# Patient Record
Sex: MALE | Race: WHITE
[De-identification: names, ages, dates, MRNs, and addresses within clinical notes are randomized per-mention and may not be internally consistent; named-entity substitution may affect disease eponyms.]

---

## 2017-03-02 ENCOUNTER — HOSPITAL ENCOUNTER (OUTPATIENT)
Dept: HOSPITAL 75 - 4TH | Age: 47
Setting detail: OBSERVATION
LOS: 1 days | Discharge: HOME | End: 2017-03-03
Attending: FAMILY MEDICINE | Admitting: FAMILY MEDICINE
Payer: COMMERCIAL

## 2017-03-02 VITALS — HEIGHT: 67 IN | WEIGHT: 152 LBS | BODY MASS INDEX: 23.86 KG/M2

## 2017-03-02 VITALS — SYSTOLIC BLOOD PRESSURE: 134 MMHG | DIASTOLIC BLOOD PRESSURE: 91 MMHG

## 2017-03-02 VITALS — SYSTOLIC BLOOD PRESSURE: 166 MMHG | DIASTOLIC BLOOD PRESSURE: 89 MMHG

## 2017-03-02 DIAGNOSIS — F17.210: ICD-10-CM

## 2017-03-02 DIAGNOSIS — F11.151: Primary | ICD-10-CM

## 2017-03-02 LAB
ALBUMIN SERPL-MCNC: 4.2 G/DL (ref 3.2–4.5)
ALT SERPL-CCNC: 21 U/L (ref 0–55)
ANION GAP SERPL CALC-SCNC: 11 MMOL/L (ref 5–14)
APAP SERPL-MCNC: < 10 UG/ML (ref 10–30)
AST SERPL-CCNC: 19 U/L (ref 5–34)
BASOPHILS # BLD AUTO: 0 10^3/UL (ref 0–0.1)
BASOPHILS NFR BLD AUTO: 0 % (ref 0–10)
BILIRUB SERPL-MCNC: 1.2 MG/DL (ref 0.1–1)
BUN SERPL-MCNC: 12 MG/DL (ref 7–18)
BUN/CREAT SERPL: 12
CALCIUM SERPL-MCNC: 9.3 MG/DL (ref 8.5–10.1)
CHLORIDE SERPL-SCNC: 105 MMOL/L (ref 98–107)
CO2 SERPL-SCNC: 20 MMOL/L (ref 21–32)
CREAT SERPL-MCNC: 0.99 MG/DL (ref 0.6–1.3)
EOSINOPHIL # BLD AUTO: 0.2 10^3/UL (ref 0–0.3)
EOSINOPHIL NFR BLD AUTO: 2 % (ref 0–10)
ERYTHROCYTE [DISTWIDTH] IN BLOOD BY AUTOMATED COUNT: 12.7 % (ref 10–14.5)
ETHANOL SERPL-MCNC: < 10 MG/DL (ref ?–10)
GFR SERPLBLD BASED ON 1.73 SQ M-ARVRAT: > 60 ML/MIN
GLUCOSE SERPL-MCNC: 89 MG/DL (ref 70–105)
INR PPP: 1 (ref 0.8–1.4)
LYMPHOCYTES # BLD AUTO: 1.8 X 10^3 (ref 1–4)
LYMPHOCYTES NFR BLD AUTO: 19 % (ref 12–44)
MCH RBC QN AUTO: 31 PG (ref 25–34)
MCHC RBC AUTO-ENTMCNC: 35 G/DL (ref 32–36)
MCV RBC AUTO: 87 FL (ref 80–99)
MONOCYTES # BLD AUTO: 0.9 X 10^3 (ref 0–1)
MONOCYTES NFR BLD AUTO: 9 % (ref 0–12)
NEUTROPHILS # BLD AUTO: 6.5 X 10^3 (ref 1.8–7.8)
NEUTROPHILS NFR BLD AUTO: 69 % (ref 42–75)
PLATELET # BLD: 276 10^3/UL (ref 130–400)
PMV BLD AUTO: 10.2 FL (ref 7.4–10.4)
POTASSIUM SERPL-SCNC: 3.6 MMOL/L (ref 3.6–5)
PROT SERPL-MCNC: 7.2 G/DL (ref 6.4–8.2)
PROTHROMBIN TIME: 13.3 SEC (ref 12.2–14.7)
RBC # BLD AUTO: 4.9 10^6/UL (ref 4.35–5.85)
SALICYLATES SERPL-MCNC: < 5 MG/DL (ref 5–20)
SODIUM SERPL-SCNC: 136 MMOL/L (ref 135–145)
WBC # BLD AUTO: 9.4 10^3/UL (ref 4.3–11)

## 2017-03-02 PROCEDURE — 99211 OFF/OP EST MAY X REQ PHY/QHP: CPT

## 2017-03-02 PROCEDURE — 85610 PROTHROMBIN TIME: CPT

## 2017-03-02 PROCEDURE — 80320 DRUG SCREEN QUANTALCOHOLS: CPT

## 2017-03-02 PROCEDURE — 36415 COLL VENOUS BLD VENIPUNCTURE: CPT

## 2017-03-02 PROCEDURE — 80306 DRUG TEST PRSMV INSTRMNT: CPT

## 2017-03-02 PROCEDURE — 80329 ANALGESICS NON-OPIOID 1 OR 2: CPT

## 2017-03-02 PROCEDURE — 81000 URINALYSIS NONAUTO W/SCOPE: CPT

## 2017-03-02 PROCEDURE — 80053 COMPREHEN METABOLIC PANEL: CPT

## 2017-03-02 PROCEDURE — 85025 COMPLETE CBC W/AUTO DIFF WBC: CPT

## 2017-03-02 RX ADMIN — SODIUM CHLORIDE SCH MLS/HR: 900 INJECTION, SOLUTION INTRAVENOUS at 17:22

## 2017-03-02 RX ADMIN — NICOTINE SCH MG: 21 PATCH, EXTENDED RELEASE TRANSDERMAL at 18:29

## 2017-03-03 VITALS — DIASTOLIC BLOOD PRESSURE: 87 MMHG | SYSTOLIC BLOOD PRESSURE: 135 MMHG

## 2017-03-03 VITALS — SYSTOLIC BLOOD PRESSURE: 138 MMHG | DIASTOLIC BLOOD PRESSURE: 86 MMHG

## 2017-03-03 VITALS — DIASTOLIC BLOOD PRESSURE: 78 MMHG | SYSTOLIC BLOOD PRESSURE: 127 MMHG

## 2017-03-03 LAB
BILIRUB UR QL STRIP: NEGATIVE
KETONES UR QL STRIP: NEGATIVE
LEUKOCYTE ESTERASE UR QL STRIP: NEGATIVE
NITRITE UR QL STRIP: NEGATIVE
PH UR STRIP: 6 [PH] (ref 5–9)
PROT UR QL STRIP: NEGATIVE
SP GR UR STRIP: 1.02 (ref 1.02–1.02)
UROBILINOGEN UR-MCNC: 4 MG/DL
WBC #/AREA URNS HPF: (no result) /HPF

## 2017-03-03 RX ADMIN — SODIUM CHLORIDE SCH MLS/HR: 900 INJECTION, SOLUTION INTRAVENOUS at 00:30

## 2017-03-03 RX ADMIN — NICOTINE SCH MG: 21 PATCH, EXTENDED RELEASE TRANSDERMAL at 09:02

## 2017-03-03 NOTE — DISCHARGE INSTRUCTIONS
Discharge [facility]


Discharge Medications


New, Converted or Re-Newed RX:  Other (No new scripts)


Continued Medications:  


Aspirin (Aspirin 81 Mg Chew Tab) 81 Mg Chew


81 MG PO DAILY








Patient Instructions


Goal/Follow Up Appt:  


You have an appointment today with outpatient treatment at 1pm


We will make sure that you have a ride to get to this appointment from the


hospital


Patient Instructions:  


- It is very important that you go to outpatient treatment for the support


you need to help with your addiction.


- We are working on inpatient treatment but we are limited do to bed


availablities and insurance


Return to The Hospital For:  


Chest pain


Shortness of breath





Activity & Diet


Discharge Diet:  No Restrictions


Activity as Tolerated:  Yes





Orders-Post D/C & Referrals


Pneu Vac Indicated:  Yes





Copy


Copies To 1:   MARILU AGUILAR MD, HOLLY R MD Mar 3, 2017 10:53
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
## 2017-06-22 ENCOUNTER — HOSPITAL ENCOUNTER (EMERGENCY)
Dept: HOSPITAL 75 - ER | Age: 47
Discharge: HOME | End: 2017-06-22
Payer: SELF-PAY

## 2017-06-22 VITALS — HEIGHT: 69 IN | BODY MASS INDEX: 25.92 KG/M2 | WEIGHT: 175 LBS

## 2017-06-22 VITALS — SYSTOLIC BLOOD PRESSURE: 138 MMHG | DIASTOLIC BLOOD PRESSURE: 90 MMHG

## 2017-06-22 DIAGNOSIS — I10: ICD-10-CM

## 2017-06-22 DIAGNOSIS — F41.9: ICD-10-CM

## 2017-06-22 DIAGNOSIS — R07.9: Primary | ICD-10-CM

## 2017-06-22 DIAGNOSIS — F17.210: ICD-10-CM

## 2017-06-22 DIAGNOSIS — M50.90: ICD-10-CM

## 2017-06-22 DIAGNOSIS — I25.10: ICD-10-CM

## 2017-06-22 DIAGNOSIS — Z95.5: ICD-10-CM

## 2017-06-22 DIAGNOSIS — E78.00: ICD-10-CM

## 2017-06-22 DIAGNOSIS — F32.9: ICD-10-CM

## 2017-06-22 DIAGNOSIS — Z79.82: ICD-10-CM

## 2017-06-22 DIAGNOSIS — G62.9: ICD-10-CM

## 2017-06-22 DIAGNOSIS — I25.2: ICD-10-CM

## 2017-06-22 LAB
ALBUMIN SERPL-MCNC: 3.9 GM/DL (ref 3.2–4.5)
ALT SERPL-CCNC: 10 U/L (ref 0–55)
ANION GAP SERPL CALC-SCNC: 7 MMOL/L (ref 5–14)
AST SERPL-CCNC: 15 U/L (ref 5–34)
BASOPHILS # BLD AUTO: 0.1 10^3/UL (ref 0–0.1)
BASOPHILS NFR BLD AUTO: 1 % (ref 0–10)
BILIRUB SERPL-MCNC: 0.4 MG/DL (ref 0.1–1)
BUN SERPL-MCNC: 7 MG/DL (ref 7–18)
BUN/CREAT SERPL: 7 (ref 0–20)
CALCIUM SERPL-MCNC: 9.2 MG/DL (ref 8.5–10.1)
CHLORIDE SERPL-SCNC: 107 MMOL/L (ref 98–107)
CO2 SERPL-SCNC: 25 MMOL/L (ref 21–32)
CREAT SERPL-MCNC: 0.95 MG/DL (ref 0.6–1.3)
EOSINOPHIL # BLD AUTO: 0.4 10^3/UL (ref 0–0.3)
EOSINOPHIL NFR BLD AUTO: 4 % (ref 0–10)
ERYTHROCYTE [DISTWIDTH] IN BLOOD BY AUTOMATED COUNT: 13.8 % (ref 10–14.5)
ETHANOL SERPL-MCNC: < 10 MG/DL (ref ?–10)
GFR SERPLBLD BASED ON 1.73 SQ M-ARVRAT: > 60 ML/MIN
GLUCOSE SERPL-MCNC: 119 MG/DL (ref 70–105)
HEMOLYSIS: 10 (ref -100–29)
ICTERUS: 0.4 (ref -100–1.9)
LIPEMIA: 5 (ref -100–49)
LYMPHOCYTES # BLD AUTO: 2.2 X 10^3 (ref 1–4)
LYMPHOCYTES NFR BLD AUTO: 24 % (ref 12–44)
MCH RBC QN AUTO: 31 PG (ref 25–34)
MCHC RBC AUTO-ENTMCNC: 34 G/DL (ref 32–36)
MCV RBC AUTO: 91 FL (ref 80–99)
MONOCYTES # BLD AUTO: 0.9 X 10^3 (ref 0–1)
MONOCYTES NFR BLD AUTO: 10 % (ref 0–12)
NEUTROPHILS # BLD AUTO: 5.6 X 10^3 (ref 1.8–7.8)
NEUTROPHILS NFR BLD AUTO: 61 % (ref 42–75)
PLATELET # BLD: 235 10^3/UL (ref 130–400)
PMV BLD AUTO: 10.5 FL (ref 7.4–10.4)
POTASSIUM SERPL-SCNC: 3.7 MMOL/L (ref 3.6–5)
PROT SERPL-MCNC: 7.2 GM/DL (ref 6.4–8.2)
RBC # BLD AUTO: 5.04 10^6/UL (ref 4.35–5.85)
SODIUM SERPL-SCNC: 139 MMOL/L (ref 135–145)
TROPONIN I SERPL-MCNC: < 0.3 NG/ML (ref ?–0.3)
WBC # BLD AUTO: 9.1 10^3/UL (ref 4.3–11)

## 2017-06-22 PROCEDURE — 80053 COMPREHEN METABOLIC PANEL: CPT

## 2017-06-22 PROCEDURE — 85025 COMPLETE CBC W/AUTO DIFF WBC: CPT

## 2017-06-22 PROCEDURE — 36415 COLL VENOUS BLD VENIPUNCTURE: CPT

## 2017-06-22 PROCEDURE — 71010: CPT

## 2017-06-22 PROCEDURE — 93005 ELECTROCARDIOGRAM TRACING: CPT

## 2017-06-22 PROCEDURE — 84484 ASSAY OF TROPONIN QUANT: CPT

## 2017-06-22 PROCEDURE — 80320 DRUG SCREEN QUANTALCOHOLS: CPT

## 2017-06-22 NOTE — ED CHEST PAIN
General


Chief Complaint:  Cardiac/General Problems


Stated Complaint:  BLOOD PANEL NEEDED POSS HEART ATTACK IN LAST WEEK


Nursing Triage Note:  


c/o intermittant chest pain x 1 month. Denies chest pain currently. Hx of heart 


stent x 2013.


Nursing Sepsis Screen:  No Definite Risk


Source:  patient, family


Exam Limitations:  no limitations





History of Present Illness


Time seen by provider:  16:21


Initial Comments


The patient is a 47-year-old white male who was sent out from [facility] 

after he revealed that he had been having chest pain for a month.  He has a 

past history of coronary artery disease.  An angiogram report from 2013 showed 

a 60 percent blockage.  He is a heavy smoker and was advised that he must quit 

smoking at that point in time.  He continues to smoke one to one and a half 

packs of cigarettes per day.  He reports that this pain is present at rest and 

if anything it improves with exercise.  He also has complaints of neck pain and 

left arm pain.  This is been present for some time and the pain goes all the 

way to the fingers.  There is also pain in a rather constant fashion along the 

medial border of the left scapula.


Timing/Duration:  other (at least one month)


Severity/Quality:  moderate


Location:  shoulder, back


Radiation:  arms (perhaps left arm)


Prior CP/Workup:  cardiac cath


Associated Symptoms:  back pain





Allergies and Home Medications


Allergies


Coded Allergies:  


     No Known Drug Allergies (Unverified , 1/4/13)





Home Medications


Aspirin 81 Mg Chew, 81 MG PO DAILY, (Reported)





Review of Systems


Constitutional:  see HPI


EENTM:  No Symptoms Reported


Respiratory:  Cough, Shortness of Air


Cardiovascular:  See HPI, Chest Pain


Gastrointestinal:  No Symptoms Reported


Genitourinary:  No Symptoms Reported


Musculoskeletal:  no symptoms reported


Skin:  no symptoms reported


Psychiatric/Neurological:  No Symptoms Reported


Endocrine:  No Symptoms Reported


Hematologic/Lymphatic:  No Symptoms Reported





Past Medical-Social-Family Hx


Patient Social History


Alcohol Use:  Occasionally Uses


Recreational Drug Use:  Yes (Hx & Current drug use)


Smoking Status:  Current Everyday Smoker


Type Used:  Cigarettes


Recent Foreign Travel:  No


Contact w/Someone Who Travel:  No


Recent Infectious Disease Expo:  No


Recent Hopitalizations:  Yes





Immunizations Up To Date


Tetanus Booster (TDap):  Unknown


PED Vaccines UTD:  No





Surgeries


HX Surgeries:  Yes (JAW FX REPAIR)


Surgeries:  Cardiac, Coronary Stent





Respiratory


Hx Respiratory Disorders:  No





Cardiovascular


Hx Cardiac Disorders:  Yes (STENT X 1 )


Cardiac Disorders:  Coronary Artery Disease, Heart Attack, Heart Murmur, High 

Cholesterol, Hypertension





Neurological


Hx Neurological Disorders:  No





Reproductive System


Hx Reproductive Disorders:  No


Sexually Transmitted Disease:  No


HIV/AIDS:  No





Genitourinary


Hx Genitourinary Disorders:  Yes


Genitourinary Disorders:  Kidney Stones





Gastrointestinal


Hx Gastrointestinal Disorders:  Yes (HX OF BLOOD IN STOOL)





Musculoskeletal


Hx Musculoskeletal Disorders:  Yes


Musculoskeletal Disorders:  Arthritis, Chronic Back Pain





Endocrine


Hx Endocrine Disorders:  No





HEENT


HX ENT Disorders:  No


Loss of Vision:  Denies


Hearing Impairment:  Denies





Cancer


Hx Cancer:  No





Psychosocial


Hx Psychiatric Problems:  Yes


Behavioral Health Disorders:  Anxiety, Depression





Integumentary


HX Skin/Integumentary Disorder:  No





Blood Transfusions


Hx Blood Disorders:  No





Physical Exam


Vital Signs





Vital Sign - Last 12Hours








 6/22/17





 14:20


 


Temp 97.5


 


Pulse 70


 


Resp 16


 


B/P (MAP) 143/104


 


O2 Delivery Room Air





Capillary Refill : Less Than 3 Seconds


General Appearance:  Mild Distress


HEENT:  Normal ENT Inspection


Neck:  Full Range of Motion, Normal Inspection, Non Tender


Respiratory:  Decreased Breath Sounds (distant)


Cardiovascular:  Regular Rate, Rhythm, No Edema, No Gallop, No JVD, No Murmur, 

Normal Peripheral Pulses


Gastrointestinal:  Normal Bowel Sounds, No Organomegaly, No Pulsatile Mass, Non 

Tender


Extremity:  Normal Capillary Refill, Normal Inspection, Normal Range of Motion, 

Non Tender, No Calf Tenderness, No Pedal Edema


Neurologic/Psychiatric:  Alert, Oriented x3, No Motor/Sensory Deficits, Normal 

Mood/Affect


Other comments


Tenderness to palpation along the medial border of the left scapula which he 

states is constant.  He also reports numbness and tingling in his left arm all 

the way to the fingertips





Progress/Results/Core Measures


Results/Orders


Lab Results





Laboratory Tests








Test


  6/22/17


14:35 Range/Units


 


 


White Blood Count


  9.1 


  4.3-11.0


10^3/uL


 


Red Blood Count


  5.04 


  4.35-5.85


10^6/uL


 


Hemoglobin 15.6  13.3-17.7  G/DL


 


Hematocrit 46  40-54  %


 


Mean Corpuscular Volume 91  80-99  FL


 


Mean Corpuscular Hemoglobin 31  25-34  PG


 


Mean Corpuscular Hemoglobin


Concent 34 


  32-36  G/DL


 


 


Red Cell Distribution Width 13.8  10.0-14.5  %


 


Platelet Count


  235 


  130-400


10^3/uL


 


Mean Platelet Volume 10.5 H 7.4-10.4  FL


 


Neutrophils (%) (Auto) 61  42-75  %


 


Lymphocytes (%) (Auto) 24  12-44  %


 


Monocytes (%) (Auto) 10  0-12  %


 


Eosinophils (%) (Auto) 4  0-10  %


 


Basophils (%) (Auto) 1  0-10  %


 


Neutrophils # (Auto) 5.6  1.8-7.8  X 10^3


 


Lymphocytes # (Auto) 2.2  1.0-4.0  X 10^3


 


Monocytes # (Auto) 0.9  0.0-1.0  X 10^3


 


Eosinophils # (Auto)


  0.4 H


  0.0-0.3


10^3/uL


 


Basophils # (Auto)


  0.1 


  0.0-0.1


10^3/uL


 


Sodium Level 139  135-145  MMOL/L


 


Potassium Level 3.7  3.6-5.0  MMOL/L


 


Chloride Level 107    MMOL/L


 


Carbon Dioxide Level 25  21-32  MMOL/L


 


Anion Gap 7  5-14  MMOL/L


 


Blood Urea Nitrogen 7  7-18  MG/DL


 


Creatinine


  0.95 


  0.60-1.30


MG/DL


 


Estimat Glomerular Filtration


Rate > 60 


   


 


 


BUN/Creatinine Ratio 7  0-20  


 


Glucose Level 119 H   MG/DL


 


Calcium Level 9.2  8.5-10.1  MG/DL


 


Total Bilirubin 0.4  0.1-1.0  MG/DL


 


Aspartate Amino Transf


(AST/SGOT) 15 


  5-34  U/L


 


 


Alanine Aminotransferase


(ALT/SGPT) 10 


  0-55  U/L


 


 


Alkaline Phosphatase 129    U/L


 


Troponin I < 0.30  <0.30  NG/ML


 


Total Protein 7.2  6.4-8.2  GM/DL


 


Albumin 3.9  3.2-4.5  GM/DL


 


Serum Alcohol < 10  <10  MG/DL








My Orders





Orders - ALDEN BAIRES MD


Cbc With Automated Diff (6/22/17 14:24)


Comprehensive Metabolic Panel (6/22/17 14:24)


Troponin I (6/22/17 14:24)


Chest 1 View, Ap/Pa Only (6/22/17 14:24)


Ekg Tracing (6/22/17 14:24)


Alcohol (6/22/17 14:28)


Drug Screen Stat (Urine) (6/22/17 14:28)





Vital Signs/I&O





Vital Sign - Last 12Hours








 6/22/17





 14:20


 


Temp 97.5


 


Pulse 70


 


Resp 16


 


B/P (MAP) 143/104


 


O2 Delivery Room Air














Blood Pressure Mean:  117











Departure


Impression


Impression:  


 Primary Impression:  


 chest pain


 Additional Impression:  


 left sided neuropathy/cervical disc disease


Disposition:  01 HOME, SELF-CARE


Condition:  Stable/Unchanged





Departure-Patient Inst.


Decision time for Depature:  16:30


Referrals:  


MARILU AGUILAR MD (PCP)


Primary Care Physician








[facility] (Family)


Primary Care Physician


Patient Instructions:  Chest Pain (DC)





Add. Discharge Instructions:  


All discharge instructions reviewed with patient and/or family. Voiced 

understanding.


Stop smoking


Make arrangements with Dr. Aguilar for stress test and evaluation of your 

cervical vertebrae/discs











ALDEN BAIRES MD Jun 22, 2017 16:27

## 2017-06-22 NOTE — DIAGNOSTIC IMAGING REPORT
INDICATION: Chest pain.



EXAM: Frontal chest obtained at 2:49 hours p.m.



FINDINGS:  

The heart and mediastinal silhouette are normal in appearance.

The lungs are clear. There is no pneumothorax or pleural fluid.



IMPRESSION:



Negative chest.



Dictated by: 



  Dictated on workstation # [id number]

## 2017-06-26 NOTE — XMS REPORT
Continuity of Care Document

 Created on: 2017



ROSE LALA

External Reference #: [id number]

: 1970

Sex: Male



Demographics







 Address  [address]  [address]

 

 Home Phone  [phone]

 

 Preferred Language  Unknown

 

 Marital Status  Unknown

 

 Yarsani Affiliation  Unknown

 

 Race  Unknown

 

 Ethnic Group  Unknown





Author







 Author  [facility]

 

 Address  Unknown

 

 Phone  Unavailable



                                                      



Allergies

                      





 Active                    Description                    Code                  
  Type                    Severity                    Reaction                  
  Onset                    Reported/Identified                    Relationship 
to Patient                    Clinical Status                

 

 Yes                    No Known Drug Allergies                    [id number]  
                  Drug Allergy                    Unknown                    N/
A                                         2013                           
                               

 

 Yes                    Zoloft 100 mg tablet                                   
      Drug Allergy                    N/A                    N/A               
                          2014                                           
               

 

 Yes                    Wellbutrin 100 mg tablet                               
          Drug Allergy                    N/A                    N/A           
                              2014                                       
                   



                                                                               
                             



Medications

                                                                               
         



Problems

                      





 Date Dx Coded                    Attending                    Type            
        Code                    Diagnosis                    Diagnosed By      
          

 

 2013                                         Ot                    
305.00                    ALCOHOL ABUSE-UNSPEC                                 
    

 

 2013                                         Ot                    305.1
                    TOBACCO USE DISORDER                                     

 

 2013                                         Ot                    
305.90                    DRUG ABUSE NEC-UNSPEC                                
     

 

 2013                                         Ot                    
414.01                    CORONARY ATHEROSCLEROSIS OF NATIVE CORON             
                        

 

 2013                                         Ot                    
786.50                    CHEST PAIN NOS                                     

 

 2013                                         Ot                    
V17.49                    FAMILY HISTORY OF OTHER CARDIOVASCULAR D             
                        

 

 2013                    MYKE PHILLIPS DO                                
         300.00                    ANXIETY UNSPEC                              
       

 

 2013                    MYKE PHILLIPS DO                                
         780.99                    ANHEDONIA                                   
  

 

 2013                    BALAJI STARR                           
              300.00                    ANXIETY UNSPEC                         
            

 

 2013                    BALAJI STARR                           
              780.99                    ANHEDONIA                              
       

 

 2013                                                              300.00
                    ANXIETY UNSPEC                                     

 

 2013                                                              780.99
                    ANHEDONIA                                     

 

 2013                    MYKE PHILLIPS DO                                
         300.00                    ANXIETY UNSPEC                              
       

 

 2013                    MYKE PHILLIPS DO                                
         780.99                    ANHEDONIA                                   
  

 

 2013                    KIANA GALLEGOS                          
               300.00                    ANXIETY UNSPEC                        
             

 

 2013                    KIANA GALLEGOS                          
               780.99                    ANHEDONIA                             
        

 

 2013                    MARILU AGUILAR MD                             
            300.00                    ANXIETY UNSPEC                           
          

 

 2013                    MARILU AGUILAR MD                             
            780.99                    ANHEDONIA                                
     

 

 2013                    MARILU AGUILAR MD                             
            300.00                    ANXIETY UNSPEC                           
          

 

 2013                    MARILU AGUILAR MD                             
            780.99                    ANHEDONIA                                
     

 

 2013                    MARILU AGUILAR MD                             
            300.00                    ANXIETY UNSPEC                           
          

 

 2013                    MARILU AGUILAR MD                             
            780.99                    ANHEDONIA                                
     

 

 2013                    NATALIA BAUTISTA DDS                            
             300.00                    ANXIETY UNSPEC                          
           

 

 2013                    NATALIA BAUTISTA DDS                            
             780.99                    ANHEDONIA                               
      

 

 2013                    BALAJI STARR                           
              719.41                    PAIN IN JOINT INVOLVING SHOULDER REGION
                                     

 

 2013                                                              719.41
                    PAIN IN JOINT INVOLVING SHOULDER REGION                    
                 

 

 2013                    MYKE PHILLIPS DO                                
         719.41                    PAIN IN JOINT INVOLVING SHOULDER REGION     
                                

 

 2013                    KIANA GALLEGOS                          
               719.41                    PAIN IN JOINT INVOLVING SHOULDER 
REGION                                     

 

 2013                    MARILU AGUILAR MD                             
            719.41                    PAIN IN JOINT INVOLVING SHOULDER REGION  
                                   

 

 2013                    MARILU AGUILAR MD                             
            719.41                    PAIN IN JOINT INVOLVING SHOULDER REGION  
                                   

 

 2013                    MARILU AGUILAR MD                             
            719.41                    PAIN IN JOINT INVOLVING SHOULDER REGION  
                                   

 

 2013                    NATALIA BAUTISTA DDS                            
             719.41                    PAIN IN JOINT INVOLVING SHOULDER REGION 
                                    

 

 2013                    MYKE PHILLIPS DO K                                
         786.50                    CHEST PAIN                                  
   

 

 2013                    KIANA GALLEGOS S                          
               786.50                    CHEST PAIN                            
         

 

 2013                    MARILU AGUILAR MD                             
            786.50                    CHEST PAIN                               
      

 

 2013                    MARILU AGUILAR MD                             
            786.50                    CHEST PAIN                               
      

 

 2013                    MARILU AGUILAR MD                             
            786.50                    CHEST PAIN                               
      

 

 2013                    NATALIA BAUTISTA DDS                            
             786.50                    CHEST PAIN                              
       

 

 10/01/2013                    TESSIE COPE MD, FACC FACP CCDS                    
Ot                    272.4                    HYPERLIPIDEMIA NEC/NOS          
                           

 

 10/01/2013                    TESSIE COPE MD, FACC FACP CCDS                    
Ot                    305.00                    ALCOHOL ABUSE-UNSPEC           
                          

 

 10/01/2013                    TESSIE COPE MD, FACC FACP CCDS                    
Ot                    305.1                    TOBACCO USE DISORDER            
                         

 

 10/01/2013                    TESSIE COPE MD, FACC FACP CCDS                    
Ot                    414.01                    CORONARY ATHEROSCLEROSIS OF 
NATIVE CORON                                     

 

 10/01/2013                    TESSIE COPE MD, FACC FACP CCDS                    
Ot                    414.4                    CORONARY ATHEROSCLEROSIS DUE TO 
CALCIFIE                                     

 

 10/01/2013                    TESSIE COPE MD, FACC FACP CCDS                    
Ot                    786.59                    CHEST PAIN NEC                 
                    

 

 10/01/2013                    TESSIE COPE MD, FACC FACP CCDS                    
Ot                    V15.81                    HX OF PAST NONCOMPLIANCE       
                              

 

 10/01/2013                    PRISCA CRAWLEY [facility], TESSIE FACP CCDS                    
Ot                    V45.82                    PERCUTANEOUS TRANSLUM CORON 
ANGIOPLASTY                                      

 

 10/01/2013                    PRISCA CRAWLEY [facility], TESSIE FACP CCDS                    
Ot                    V58.63                    LONG-TERM(CURRENT)USE OF 
ANTIPLATELET/AN                                     

 

 10/01/2013                    [facility]RAW[facility]                    
Ot                    V58.69                    OTH MED,LT,CURRENT USE         
                            

 

 10/25/2013                    PHILLIPS DO MYKE K                                
         272.4                    HYPERLIPIDEMIA                               
      

 

 10/25/2013                    PHILLIPS DO MYKE K                                
         401.1                    HYPERTENSION, BENIGN ESSENTIAL               
                      

 

 10/25/2013                    PHILLIPS DO, MYKE K                                
         414.00                    CORONARY ATHEROSCLEROSIS OF UNSPECIFIED TYPE 
OF VESSEL NATIVE OR GRAFT                                     

 

 10/25/2013                    KIANA GALLEGOS S                          
               272.4                    HYPERLIPIDEMIA                         
            

 

 10/25/2013                    KIANA GALLEGOS S                          
               401.1                    HYPERTENSION, BENIGN ESSENTIAL         
                            

 

 10/25/2013                    KIANA GALLEGOS S                          
               414.00                    CORONARY ATHEROSCLEROSIS OF 
UNSPECIFIED TYPE OF VESSEL NATIVE OR GRAFT                                     

 

 10/25/2013                    MARILU AGUILAR MD N                             
            272.4                    HYPERLIPIDEMIA                            
         

 

 10/25/2013                    MARILU AGUILAR MD N                             
            401.1                    HYPERTENSION, BENIGN ESSENTIAL            
                         

 

 10/25/2013                    MARILU AGUILAR MD N                             
            414.00                    CORONARY ATHEROSCLEROSIS OF UNSPECIFIED 
TYPE OF VESSEL NATIVE OR GRAFT                                     

 

 10/25/2013                    MARILU AGUILAR MD N                             
            272.4                    HYPERLIPIDEMIA                            
         

 

 10/25/2013                    MARILU AGUILAR MD N                             
            401.1                    HYPERTENSION, BENIGN ESSENTIAL            
                         

 

 10/25/2013                    MARILU AGUILAR MD N                             
            414.00                    CORONARY ATHEROSCLEROSIS OF UNSPECIFIED 
TYPE OF VESSEL NATIVE OR GRAFT                                     

 

 10/25/2013                    MARILU AGUILAR MD N                             
            272.4                    HYPERLIPIDEMIA                            
         

 

 10/25/2013                    MARILU AGUILAR MD N                             
            401.1                    HYPERTENSION, BENIGN ESSENTIAL            
                         

 

 10/25/2013                    MARILU AGUILAR MD N                             
            414.00                    CORONARY ATHEROSCLEROSIS OF UNSPECIFIED 
TYPE OF VESSEL NATIVE OR GRAFT                                     

 

 10/25/2013                    WHITE DDS, NATALIA J                            
             272.4                    HYPERLIPIDEMIA                           
          

 

 10/25/2013                    WHITE DDS, NATALIA J                            
             401.1                    HYPERTENSION, BENIGN ESSENTIAL           
                          

 

 10/25/2013                    WHITE DDS, NATALIA J                            
             414.00                    CORONARY ATHEROSCLEROSIS OF UNSPECIFIED 
TYPE OF VESSEL NATIVE OR GRAFT                                     

 

 2013                    KIANA GALLEGOS S                          
               525.9                    UNSPECIFIED DISORDER OF THE TEETH AND 
SUPPORTING STRUCTURES                                     

 

 2013                    MARILU AGUILAR MD N                             
            525.9                    UNSPECIFIED DISORDER OF THE TEETH AND 
SUPPORTING STRUCTURES                                     

 

 2013                    MARILU AGUILAR MD N                             
            525.9                    UNSPECIFIED DISORDER OF THE TEETH AND 
SUPPORTING STRUCTURES                                     

 

 2013                    MARILU AGUILAR MD N                             
            525.9                    UNSPECIFIED DISORDER OF THE TEETH AND 
SUPPORTING STRUCTURES                                     

 

 2013                    WHITE DDS, NATALIA J                            
             525.9                    UNSPECIFIED DISORDER OF THE TEETH AND 
SUPPORTING STRUCTURES                                     

 

 2014                    MARILU AGUILAR MD N                             
            303.90                    OTHER AND UNSPECIFIED ALCOHOL DEPENDENCE 
UNSPECIFIED DRINKING BEHAVIOR                                     

 

 2014                    MARILU AGUILAR MD N                             
            578.1                    BLOOD IN STOOL                            
         

 

 2014                    MARILU AGUILAR MD N                             
            786.30                    HEMOPTYSIS UNSPECIFIED                   
                  

 

 2014                    MARILU AGUILAR MD N                             
            303.90                    OTHER AND UNSPECIFIED ALCOHOL DEPENDENCE 
UNSPECIFIED DRINKING BEHAVIOR                                     

 

 2014                    MARILU AGUILAR MD N                             
            578.1                    BLOOD IN STOOL                            
         

 

 2014                    MARILU AGUILAR MD N                             
            786.30                    HEMOPTYSIS UNSPECIFIED                   
                  

 

 2014                    MARILU AGUILAR MD N                             
            303.90                    OTHER AND UNSPECIFIED ALCOHOL DEPENDENCE 
UNSPECIFIED DRINKING BEHAVIOR                                     

 

 2014                    MARILU AGUILAR MD N                             
            578.1                    BLOOD IN STOOL                            
         

 

 2014                    MARILU AGUILAR MD N                             
            786.30                    HEMOPTYSIS UNSPECIFIED                   
                  

 

 2014                    NATALIA BAUTISTA DDS J                            
             303.90                    OTHER AND UNSPECIFIED ALCOHOL DEPENDENCE 
UNSPECIFIED DRINKING BEHAVIOR                                     

 

 2014                    NATALIA BAUTISTA DDS J                            
             578.1                    BLOOD IN STOOL                           
          

 

 2014                    MICHELE EDGESNATALIA J                            
             786.30                    HEMOPTYSIS UNSPECIFIED                  
                   

 

 2014                    STEVIE REAVES DO                    Ot           
         305.00                    ALCOHOL ABUSE-UNSPEC                        
             

 

 2014                    STEVIE REAVES DO                    Ot           
         305.90                    DRUG ABUSE NEC-UNSPEC                       
              

 

 2014                    STEVIE REAVES DO                    Ot           
         780.97                    ALTERED MENTAL STATUS                       
              

 

 2014                    MARILU AGUILAR MD N                             
            356.9                    UNSPECIFIED IDIOPATHIC PERIPHERAL 
NEUROPATHY                                     

 

 2014                    WHITE DDSNATALIA J                            
             356.9                    UNSPECIFIED IDIOPATHIC PERIPHERAL 
NEUROPATHY                                     

 

 2015                    WHITE DDSNATALIA J                            
             786.50                    CHEST PAIN                              
       

 

 2015                                         Ot                    
414.00                                                          

 

 2015                    BAIMA, SURI L ARNP                    Ot     
               272.4                                                          

 

 2015                    BAIMA, SURI L ARNP                    Ot     
               401.9                                                          

 

 2015                    BAIMA, SURI L ARNP                    Ot     
               414.00                                                          

 

 2015                    BAIMA, SURI L ARNP                    Ot     
               V58.69                                                          

 

 2015                    WHITE DDS, NATALIA J                            
             V01.6                    EXPOSURE TO VENEREAL DISEASES            
                         

 

 2015                    MARILU AGUILAR MD                    Ot       
             008.8                    VIRAL ENTERITIS NOS                      
               

 

 2015                    MARILU AGUILAR MD                    Ot       
             272.0                    PURE HYPERCHOLESTEROLEM                  
                   

 

 2015                    MARILU AGUILAR MD                    Ot       
             305.20                    CANNABIS ABUSE-UNSPEC                   
                  

 

 2015                    MARILU AGUILAR MD                    Ot       
             401.9                    HYPERTENSION NOS                         
            

 

 2015                    MARILU AGUILAR MD                    Ot       
             414.01                    CORONARY ATHEROSCLEROSIS OF NATIVE CORON
                                     

 

 2015                    MARILU AGUILAR MD                    Ot       
             V11.3                    HX OF ALCOHOLISM                         
            

 

 2015                    MARILU AGUILAR MD                    Ot       
             V13.01                    PERSONAL HISTORY OF URINARY CALCULI     
                                

 

 2015                    MARILU AGUILAR MD                    Ot       
             V45.82                    PERCUTANEOUS TRANSLUM CORON ANGIOPLASTY 
                                     

 

 2015                                         Ot                    
414.00                                                          

 

 2015                    BAIMA, SURI L ARNP                    Ot     
               272.4                                                          

 

 2015                    BAIMA, SURI L ARNP                    Ot     
               401.9                                                          

 

 2015                    BAIMA, SURI L ARNP                    Ot     
               414.00                                                          

 

 2015                    BAIMA, SURI L ARNP                    Ot     
               V58.69                                                          

 

 2015                    BAIMA, SURI L ARNP                    Ot     
               272.4                                                          

 

 2015                    BAIMA, SURI L ARNP                    Ot     
               414.00                                                          

 

 2015                    BAIMA, SURI L ARNP                    Ot     
               786.50                                                          

 

 2017                                         Ot                    
414.00                    CORON ATHEROSCLER NOS TYPE VESSEL, NATIV             
                        

 

 2017                    BAIMA, SURI L ARNP                    Ot     
               272.4                    HYPERLIPIDEMIA NEC/NOS                 
                    

 

 2017                    BAIMA, SURI L ARNP                    Ot     
               401.9                    HYPERTENSION NOS                       
              

 

 2017                    BAIMA, SURI L ARNP                    Ot     
               414.00                    CORON ATHEROSCLER NOS TYPE VESSEL, 
NATIV                                     

 

 2017                    BAIMA, SURI L ARNP                    Ot     
               V58.69                    OT MED,LT,CURRENT USE                
                     

 

 2017                    BAIMA, SURI L ARNP                    Ot     
               272.4                    HYPERLIPIDEMIA NEC/NOS                 
                    

 

 2017                    BAIMA, SURI L ARNP                    Ot     
               414.00                    CORON ATHEROSCLER NOS TYPE VESSEL, 
NATIV                                     

 

 2017                    BAIMA, SURI L ARNP                    Ot     
               786.50                    CHEST PAIN NOS                        
             

 

 2017                    TUSHAR MARLOW MD                    Ot         
           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  
                                    

 

 2017                    TUSHAR MARLOW MD                    Ot         
           F17.210                    NICOTINE DEPENDENCE, CIGARETTES, UNCOMPL 
                                    

 

 2017                                         Ot                    
414.00                    CORON ATHEROSCLER NOS TYPE VESSEL, NATIV             
                        

 

 2017                    BAIMA, SURI L ARNP                    Ot     
               272.4                    HYPERLIPIDEMIA NEC/NOS                 
                    

 

 2017                    BAIMA, SURI L ARNP                    Ot     
               401.9                    HYPERTENSION NOS                       
              

 

 2017                    BAIMA, SURI L ARNP                    Ot     
               414.00                    CORON ATHEROSCLER NOS TYPE VESSEL, 
NATIV                                     

 

 2017                    BAIMA, SURI L ARNP                    Ot     
               V58.69                    OTH MED,LT,CURRENT USE                
                     

 

 2017                    BAIMA, SURI L ARNP                    Ot     
               272.4                    HYPERLIPIDEMIA NEC/NOS                 
                    

 

 2017                    BAIMA, SURI L ARNP                    Ot     
               414.00                    CORON ATHEROSCLER NOS TYPE VESSEL, 
NATIV                                     

 

 2017                    BAIMA, SURI L ARNP                    Ot     
               786.50                    CHEST PAIN NOS                        
             

 

 2017                                         Ot                    
414.00                    CORON ATHEROSCLER NOS TYPE VESSEL, NATIV             
                        

 

 2017                    BAIMA, SURI L ARNP                    Ot     
               272.4                    HYPERLIPIDEMIA NEC/NOS                 
                    

 

 2017                    BAIMA, SURI L ARNP                    Ot     
               401.9                    HYPERTENSION NOS                       
              

 

 2017                    BAIMA, SURI L ARNP                    Ot     
               414.00                    CORON ATHEROSCLER NOS TYPE VESSEL, 
NATIV                                     

 

 2017                    BAIMA, SURI L ARNP                    Ot     
               V58.69                    OTH MED,LT,CURRENT USE                
                     

 

 2017                    BAIMA, SURI L ARNP                    Ot     
               272.4                    HYPERLIPIDEMIA NEC/NOS                 
                    

 

 2017                    BAIMA, SUIR L ARNP                    Ot     
               414.00                    CORON ATHEROSCLER NOS TYPE VESSEL, 
NATIV                                     

 

 2017                    BAIMA, SURI L ARNP                    Ot     
               786.50                    CHEST PAIN NOS                        
             

 

 2017                                         Ot                    
414.00                    CORON ATHEROSCLER NOS TYPE VESSEL, NATIV             
                        

 

 2017                    BAIMA, SURI L ARNP                    Ot     
               272.4                    HYPERLIPIDEMIA NEC/NOS                 
                    

 

 2017                    BAIMA, SURI L ARNP                    Ot     
               401.9                    HYPERTENSION NOS                       
              

 

 2017                    BAIMA, SURI L ARNP                    Ot     
               414.00                    CORON ATHEROSCLER NOS TYPE VESSEL, 
NATIV                                     

 

 2017                    BAIMA, SURI L ARNP                    Ot     
               V58.69                    OTH MED,LT,CURRENT USE                
                     

 

 2017                    BAIMA, SURI L ARNP                    Ot     
               272.4                    HYPERLIPIDEMIA NEC/NOS                 
                    

 

 2017                    BAIMA, SURI L ARNP                    Ot     
               414.00                    CORON ATHEROSCLER NOS TYPE VESSEL, 
NATIV                                     

 

 2017                    BAIMA, SURI L ARNP                    Ot     
               786.50                    CHEST PAIN NOS                        
             

 

 2017                                         Ot                    
414.00                    CORON ATHEROSCLER NOS TYPE VESSEL, NATIV             
                        

 

 2017                    BAIMA, SURI L ARNP                    Ot     
               272.4                    HYPERLIPIDEMIA NEC/NOS                 
                    

 

 2017                    BAIMA, SURI L ARNP                    Ot     
               401.9                    HYPERTENSION NOS                       
              

 

 2017                    BAIMA, SURI L ARNP                    Ot     
               414.00                    CORON ATHEROSCLER NOS TYPE VESSEL, 
NATIV                                     

 

 2017                    BAIMA, SURI L ARNP                    Ot     
               V58.69                    OTH MED,LT,CURRENT USE                
                     

 

 2017                    BAIMA, SURI L ARNP                    Ot     
               272.4                    HYPERLIPIDEMIA NEC/NOS                 
                    

 

 2017                    BAIMA, SURI L ARNP                    Ot     
               414.00                    CORON ATHEROSCLER NOS TYPE VESSEL, 
NATIV                                     

 

 2017                    BAIMA, SURI L ARNP                    Ot     
               786.50                    CHEST PAIN NOS                        
             



                                                                               
                                                                               
                                                                               
                                                                               
                                                                               
                                                                               
                                                                               
                                                                               
                                                                               
                                                                               
                                                                               
                                                                               
                                                                               
                                                                               
                                                                               
                                                                               
                                                                               
                                                                               
                                                                               
                                                                               
                            



Procedures

                      





 Code                    Description                    Performed By           
         Performed On                

 

                     Cardiolog                                                 
         Eugene Lang                                                           
2013                

 

                     10864                                                     
     THERAPUTIC INJ SQ/IM                                                      
     2013                

 

                                                                          
     ROCEPHIN INJ 1 g                                                           
2013                

 

                     05130                                                     
     THERAPUTIC INJ SQ/IM                                                      
     2013                

 

                                                                          
     ROCEPHIN INJ 1 g                                                           
2013                

 

                     GENERAL S                                                 
         ANTHONY ZAPATA                                                         
  2014                

 

                     95053                                                     
     XRAY CERVICAL SPINE, 2 OR 3 VIEWS                                         
                  2014                

 

                     23192                                                     
     XRAY LUMBAR SPINE 2 OR 3 VIEWS                                            
               2014                

 

                     16658                                                     
     CMP                                                           2014  
              

 

                     36391                                                     
     VIT B 12                                                           2014                

 

                     11838                                                     
     FOLATE                                                           2014                

 

                     19789                                                     
     NUCLEAR STRESS TESTING                                                    
       2015                

 

                     87597                                                     
     OXIMETRY                                                           2015                



                                                                               
                                                                               
                                                  



Results

                      





 Test                    Result                    Range                









 PT panel in platelet poor plasma by coagulation assay - 17 16:36        
        









 Prothrombin time (PT) in platelet poor plasma by coagulation assay            
        13.3 s                    12.2-14.7                

 

 INR in platelet poor plasma or blood by coagulation assay                    
1.0                     0.8-1.4                









 Comprehensive metabolic panel - 17 16:36                









 Serum or plasma sodium measurement (moles/volume)                    136 mmol/
L                    135-145                

 

 Serum or plasma potassium measurement (moles/volume)                    3.6 
mmol/L                    3.6-5.0                

 

 Serum or plasma chloride measurement (moles/volume)                    105 mmol
/L                                    

 

 Carbon dioxide                    20 mmol/L                    21-32          
      

 

 Serum or plasma anion gap determination (moles/volume)                    11 
mmol/L                    5-14                

 

 Serum or plasma urea nitrogen measurement (mass/volume)                    12 
mg/dL                    7-18                

 

 Serum or plasma creatinine measurement (mass/volume)                    0.99 mg
/dL                    0.60-1.30                

 

 Serum or plasma urea nitrogen/creatinine mass ratio                    12     
                NRG                

 

 Serum or plasma creatinine measurement with calculation of estimated 
glomerular filtration rate                    >                     NRG        
        

 

 Serum or plasma glucose measurement (mass/volume)                    89 mg/dL 
                                   

 

 Serum or plasma calcium measurement (mass/volume)                    9.3 mg/dL
                    8.5-10.1                

 

 Serum or plasma total bilirubin measurement (mass/volume)                    
1.2 mg/dL                    0.1-1.0                

 

 Serum or plasma alkaline phosphatase measurement (enzymatic activity/volume)  
                  117 U/L                                    

 

 Serum or plasma aspartate aminotransferase measurement (enzymatic activity/
volume)                    19 U/L                    5-34                

 

 Serum or plasma alanine aminotransferase measurement (enzymatic activity/volume
)                    21 U/L                    0-55                

 

 Serum or plasma protein measurement (mass/volume)                    7.2 g/dL 
                   6.4-8.2                

 

 Serum or plasma albumin measurement (mass/volume)                    4.2 g/dL 
                   3.2-4.5                









 Serum or plasma salicylates measurement (mass/volume) - 17 16:36        
        









 Serum or plasma salicylates measurement (mass/volume)                    < mg/
dL                    5.0-20.0                









 Serum or plasma acetaminophen measurement (mass/volume) - 17 16:36      
          









 Serum or plasma acetaminophen measurement (mass/volume)                    < ug
/mL                    10-30                









 Serum or plasma ethanol measurement (mass/volume) - 17 16:36            
    









 Serum or plasma ethanol measurement (mass/volume)                    < mg/dL  
                  <10                









 Complete blood count (CBC) with automated white blood cell (WBC) differential 
- 17 16:36                









 Blood leukocytes automated count (number/volume)                    9.4 10*3/
uL                    4.3-11.0                

 

 Blood erythrocytes automated count (number/volume)                    4.90 10*6
/uL                    4.35-5.85                

 

 Venous blood hemoglobin measurement (mass/volume)                    15.1 g/dL
                    13.3-17.7                

 

 Blood hematocrit (volume fraction)                    43 %                    
40-54                

 

 Automated erythrocyte mean corpuscular volume                    87 [foz_us]  
                  80-99                

 

 Automated erythrocyte mean corpuscular hemoglobin (mass per erythrocyte)      
              31 pg                    25-34                

 

 Automated erythrocyte mean corpuscular hemoglobin concentration measurement (
mass/volume)                    35 g/dL                    32-36                

 

 Automated erythrocyte distribution width ratio                    12.7 %      
              10.0-14.5                

 

 Automated blood platelet count (count/volume)                    276 10*3/uL  
                  130-400                

 

 Automated blood platelet mean volume measurement                    10.2 [foz_
us]                    7.4-10.4                

 

 Automated blood neutrophils/100 leukocytes                    69 %            
        42-75                

 

 Automated blood lymphocytes/100 leukocytes                    19 %            
        12-44                

 

 Blood monocytes/100 leukocytes                    9 %                    0-12 
               

 

 Automated blood eosinophils/100 leukocytes                    2 %             
       0-10                

 

 Automated blood basophils/100 leukocytes                    0 %               
     0-10                

 

 Blood neutrophils automated count (number/volume)                    6.5 10*3 
                   1.8-7.8                

 

 Blood lymphocytes automated count (number/volume)                    1.8 10*3 
                   1.0-4.0                

 

 Blood monocytes automated count (number/volume)                    0.9 10*3   
                 0.0-1.0                

 

 Automated eosinophil count                    0.2 10*3/uL                    
0.0-0.3                

 

 Automated blood basophil count (count/volume)                    0.0 10*3/uL  
                  0.0-0.1                









 Complete urinalysis with reflex to culture - 17 09:07                









 Urine color determination                    YELLOW                     NRG   
             

 

 Urine clarity determination                    CLEAR                     NRG  
              

 

 Urine pH measurement by test strip                    6                     5-
9                

 

 Specific gravity of urine by test strip                    1.020              
       1.016-1.022                

 

 Urine protein assay by test strip, semi-quantitative                    
NEGATIVE                     NEGATIVE                

 

 Urine glucose detection by automated test strip                    NEGATIVE   
                  NEGATIVE                

 

 Erythrocytes detection in urine sediment by light microscopy                  
  NEGATIVE                     NEGATIVE                

 

 Urine ketones detection by automated test strip                    NEGATIVE   
                  NEGATIVE                

 

 Urine nitrite detection by test strip                    NEGATIVE             
        NEGATIVE                

 

 Urine total bilirubin detection by test strip                    NEGATIVE     
                NEGATIVE                

 

 Urine urobilinogen measurement by automated test strip (mass/volume)          
          4 mg/dL                    NORMAL                

 

 Urine leukocyte esterase detection by dipstick                    NEGATIVE    
                 NEGATIVE                

 

 Automated urine sediment erythrocyte count by microscopy (number/high power 
field)                    NONE                     NRG                

 

 Automated urine sediment leukocyte count by microscopy (number/high power field
)                    NONE                     NRG                

 

 Bacteria detection in urine sediment by light microscopy                    
NEGATIVE                     NRG                

 

 Crystals detection in urine sediment by light microscopy                    
NONE                     NRG                

 

 Casts detection in urine sediment by light microscopy                    NONE 
                    NRG                

 

 Mucus detection in urine sediment by light microscopy                    SMALL
                     NRG                

 

 Complete urinalysis with reflex to culture                    NO              
       NRG                









 Urine drug screening test - 17 09:07                









 Urine phencyclidine detection by screening method                    NEGATIVE 
                    NEGATIVE                

 

 Urine benzodiazepines detection by screening method                    
POSITIVE                     NEGATIVE                

 

 Urine cocaine detection                    NEGATIVE                     
NEGATIVE                

 

 Urine amphetamines detection by screening method                    POSITIVE  
                   NEGATIVE                

 

 Urine methamphetamine detection by screening method                    
NEGATIVE                     NEGATIVE                

 

 Urine cannabinoids detection by screening method                    POSITIVE  
                   NEGATIVE                

 

 Urine opiates detection by screening method                    NEGATIVE       
              NEGATIVE                

 

 Urine barbiturates detection                    NEGATIVE                     
NEGATIVE                

 

 Screening urine tricyclic antidepressants detection                    
NEGATIVE                     NEGATIVE                

 

 Urine methadone detection by screening method                    NEGATIVE     
                NEGATIVE                

 

 Urine oxycodone detection                    NEGATIVE                     
NEGATIVE                

 

 Urine propoxyphene detection                    NEGATIVE                     
NEGATIVE                









 Complete blood count (CBC) with automated white blood cell (WBC) differential 
- 17 14:35                









 Blood leukocytes automated count (number/volume)                    9.1 10*3/
uL                    4.3-11.0                

 

 Blood erythrocytes automated count (number/volume)                    5.04 10*6
/uL                    4.35-5.85                

 

 Venous blood hemoglobin measurement (mass/volume)                    15.6 g/dL
                    13.3-17.7                

 

 Blood hematocrit (volume fraction)                    46 %                    
40-54                

 

 Automated erythrocyte mean corpuscular volume                    91 [foz_us]  
                  80-99                

 

 Automated erythrocyte mean corpuscular hemoglobin (mass per erythrocyte)      
              31 pg                    25-34                

 

 Automated erythrocyte mean corpuscular hemoglobin concentration measurement (
mass/volume)                    34 g/dL                    32-36                

 

 Automated erythrocyte distribution width ratio                    13.8 %      
              10.0-14.5                

 

 Automated blood platelet count (count/volume)                    235 10*3/uL  
                  130-400                

 

 Automated blood platelet mean volume measurement                    10.5 [foz_
us]                    7.4-10.4                

 

 Automated blood neutrophils/100 leukocytes                    61 %            
        42-75                

 

 Automated blood lymphocytes/100 leukocytes                    24 %            
        12-44                

 

 Blood monocytes/100 leukocytes                    10 %                    0-12
                

 

 Automated blood eosinophils/100 leukocytes                    4 %             
       0-10                

 

 Automated blood basophils/100 leukocytes                    1 %               
     0-10                

 

 Blood neutrophils automated count (number/volume)                    5.6 10*3 
                   1.8-7.8                

 

 Blood lymphocytes automated count (number/volume)                    2.2 10*3 
                   1.0-4.0                

 

 Blood monocytes automated count (number/volume)                    0.9 10*3   
                 0.0-1.0                

 

 Automated eosinophil count                    0.4 10*3/uL                    
0.0-0.3                

 

 Automated blood basophil count (count/volume)                    0.1 10*3/uL  
                  0.0-0.1                









 Comprehensive metabolic panel - 17 14:35                









 Serum or plasma sodium measurement (moles/volume)                    139 mmol/
L                    135-145                

 

 Serum or plasma potassium measurement (moles/volume)                    3.7 
mmol/L                    3.6-5.0                

 

 Serum or plasma chloride measurement (moles/volume)                    107 mmol
/L                                    

 

 Carbon dioxide                    25 mmol/L                    21-32          
      

 

 Serum or plasma anion gap determination (moles/volume)                    7 
mmol/L                    5-14                

 

 Serum or plasma urea nitrogen measurement (mass/volume)                    7 mg
/dL                    7-18                

 

 Serum or plasma creatinine measurement (mass/volume)                    0.95 mg
/dL                    0.60-1.30                

 

 Serum or plasma urea nitrogen/creatinine mass ratio                    7      
               0-20                

 

 Serum or plasma creatinine measurement with calculation of estimated 
glomerular filtration rate                    >                     NRG        
        

 

 Serum or plasma glucose measurement (mass/volume)                    119 mg/dL
                                    

 

 Serum or plasma calcium measurement (mass/volume)                    9.2 mg/dL
                    8.5-10.1                

 

 Serum or plasma total bilirubin measurement (mass/volume)                    
0.4 mg/dL                    0.1-1.0                

 

 Serum or plasma alkaline phosphatase measurement (enzymatic activity/volume)  
                  129 U/L                                    

 

 Serum or plasma aspartate aminotransferase measurement (enzymatic activity/
volume)                    15 U/L                    5-34                

 

 Serum or plasma alanine aminotransferase measurement (enzymatic activity/volume
)                    10 U/L                    0-55                

 

 Serum or plasma protein measurement (mass/volume)                    7.2 g/dL 
                   6.4-8.2                

 

 Serum or plasma albumin measurement (mass/volume)                    3.9 g/dL 
                   3.2-4.5                









 Serum or plasma troponin i.cardiac measurement (mass/volume) - 17 14:35 
               









 Serum or plasma troponin i.cardiac measurement (mass/volume)                  
  < ng/mL                    <0.30                









 Serum or plasma ethanol measurement (mass/volume) - 17 14:35            
    









 Serum or plasma ethanol measurement (mass/volume)                    < mg/dL  
                  <10                



                                                                               
                                                                               
                              



Encounters

                      





 ACCT No.                    Visit Date/Time                    Discharge      
              Status                    Pt. Type                    Provider   
                 Facility                    Loc./Unit                    
Complaint                

 

 [id number]                    2015 08:03:00                    2015 23:
59:59                    CLS                    Outpatient                    
MICHELE NATALIA ALBARADO [facility]ILMAR                                                           
                    

 

 [id number]                    2014 14:12:00                    2014 23:
59:59                    CLS                    Outpatient                    
MARILU AGUILAR MD                                                            
                   

 

 [id number]                    2014 09:45:00                    2014 23:
59:59                    CLS                    Outpatient                    
MARILU AGUILAR MD                                                            
                   

 

 [id number]                    2014 10:08:00                    2014 23:
59:59                    CLS                    Outpatient                    
MARILU AGUILAR MD                                                            
                   

 

 [id number]                    2013 10:56:00                    2013 23:
59:59                    CLS                    Outpatient                    
KIANA GALLEGOS                                                         
                      

 

 [id number]                    10/25/2013 14:47:00                    10/25/2013 23:
59:59                    CLS                    Outpatient                    
MYKE PHILLIPS DO                                                               
                

 

 [id number]                    2013 10:56:00                    2013 23:
59:59                    CLS                    Outpatient                    
BALAJI STARR                                                          
                     

 

 [id number]                    2013 08:47:00                    2013 23:
59:59                    CLS                    Outpatient                    
MYKE PHILLIPS DO                                                               
                

 

 [id number]                    2013 11:04:00                                 
                             Document Registration

## 2017-06-26 NOTE — XMS REPORT
[facility]

 Created on: 2016



Greg Serna

External Reference #: [id number]

: 1970

Sex: Male



Demographics







 Address  [address]

[place]  [address]

 

 Home Phone  [phone]

 

 Preferred Language  Unknown

 

 Marital Status  Unknown

 

 Episcopal Affiliation  Unknown

 

 Race  White

 

 Ethnic Group  Not  or 





Author







 [facility]  eClinicalWorks

 

 Address  Unknown

 

 Phone  Unavailable







Care Team Providers







 Care Team Member Name  Role  Phone

 

 [facility]  Unavailable



                                                                



Allergies, Adverse Reactions, Alerts

          





 Substance  Reaction  Event Type

 

 N.K.D.A.  Info Not Available  Non Drug Allergy



                                                                               
         



Problems

          





 Problem Type  Condition  Code  Onset Dates  Condition Status

 

 Assessment  Dental examination  Z01.20     Active

 

 Problem  Degenerative disc disease, thoracic  M51.34     Active

 

 Problem  Degenerative disc disease, lumbar  M51.36     Active

 

 Problem  Anxiety  F41.9     Active

 

 Problem  Essential hypertension  I10     Active

 

 Problem  Tobacco use  Z72.0     Active

 

 Problem  Alcoholism  F10.20     Active

 

 Problem  Alcoholic peripheral neuropathy  G62.1     Active

 

 Problem  Coronary artery disease of native artery of native heart with stable 
angina pectoris  I25.119     Active

 

 Problem  Hyperlipidemia  E78.5     Active



                                                                               
                                                                               
                    



Medications

          





 Medication  Code System  Code  Instructions  Start Date  End Date  Status  
Dosage

 

 BusPIRone HCl  NDC  [address]-97  30 MG Orally 2 times a day           1 
tablet

 

 Chantix Starting Month Fco  NDC  [address]-34  0.5 MG X 11 & 1 MG X 42 Orally
   2016        as directed

 

 Aspirin  NDC  77503-47680  81 mg    2013        take 1 tablet (81 mg) 
by oral route once daily

 

 Metoprolol Succinate ER  NDC  [address]-19  50 mg Orally Once a day          1 tablet

 

 clopidogrel  NDC  0  75 mg    2013        take 1 tablet (75 mg) by 
oral route once daily

 

 Nitroglycerin  NDC  [address]-08  0.4 mg    2015        place 1 
tablet (0.4 mg) by sublingual route at the 1st sign of attack; may repeat every 
5 min until relief; if pain persists after 3 tablets in 15 min, prompt medical 
attention is recommended

 

 Metoprolol Succinate ER  NDC  20765326514  50 MG             TAKE ONE TABLET 
BY MOUTH DAILY

 

 Amoxicillin  NDC  [address]-35  500 MG Orally 4 times daily     Oct 10, 2016 
    1 capsule

 

 BusPIRone HCl  NDC  [address]-04  15 MG Orally twice a day  2016   
     2 tablets

 

 Vitamin B-12  NDC  68091234379  1,000             TAKE 1 TABLET BY MOUTH DAILY

 

 Atorvastatin Calcium  NDC  [address]-36  20 mg Orally Once a day  2016        1 tablet



                                                                               
                                                                               
                              



Procedures

          





 Procedure  Coding System  Code  Date

 

 PANORAMIC FILM SEE ALSO CODE 92869  CPT-4    2016

 

 LTD ORAL EVALUATION - PROBLEM FOCUS  CPT-4    2016



                                                                               
                             



Vital Signs

          





 Date/Time:  2016

 

 Blood Pressure Diastolic  82 mmHg

 

 Blood Pressure Systolic  134 mmHg

 

 Height  67 in



                                                                              



Results

          No Known Results                                                     
               



Summary Purpose

          eClinicalWorks Submission

## 2018-02-16 NOTE — XMS REPORT
[facility]

 Created on: 2016



Greg Serna

External Reference #: [id number]

: 1970

Sex: Male



Demographics







 Address  [address]

[place]  [address]

 

 Home Phone  [phone]

 

 Preferred Language  Unknown

 

 Marital Status  Unknown

 

 Worship Affiliation  Unknown

 

 Race  White

 

 Ethnic Group  Not  or 





Author







 Author  MARILU AGUILAR  eClinicalWorks

 

 Address  Unknown

 

 Phone  Unavailable







Care Team Providers







 Care Team Member Name  Role  Phone

 

 MARILU AGUILAR  CP  Unavailable



                                                                



Allergies

          No Known Allergies                                                   
                                     



Problems

          





 Problem Type  Condition  Code  Onset Dates  Condition Status

 

 Problem  Degenerative disc disease, thoracic  M51.34     Active

 

 Problem  Degenerative disc disease, lumbar  M51.36     Active

 

 Problem  Anxiety  F41.9     Active

 

 Problem  Essential hypertension  I10     Active

 

 Problem  Tobacco use  Z72.0     Active

 

 Problem  Alcoholism  F10.20     Active

 

 Problem  Alcoholic peripheral neuropathy  G62.1     Active

 

 Problem  Coronary artery disease of native artery of native heart with stable 
angina pectoris  I25.119     Active

 

 Problem  Hyperlipidemia  E78.5     Active



                                                                               
                                                                               
          



Medications

          





 Medication  Code System  Code  Instructions  Start Date  End Date  Status  
Dosage

 

 BusPIRone HCl  NDC  [address]-04  15 MG Orally twice a day  2016   
     2 tablets

 

 Atorvastatin Calcium  NDC  [address]-74  20 mg Orally Once a day  2016        1 tablet

 

 Metoprolol Succinate ER  NDC  [address]-73  50 mg Orally Once a day          1 tablet



                                                                               
                   



Results

          No Known Results                                                     
               



Summary Purpose

          eClinicalWorks Submission on unit

## 2018-03-19 ENCOUNTER — HOSPITAL ENCOUNTER (INPATIENT)
Dept: HOSPITAL 75 - ER | Age: 48
LOS: 2 days | Discharge: HOME | DRG: 246 | End: 2018-03-21
Attending: INTERNAL MEDICINE | Admitting: INTERNAL MEDICINE
Payer: SELF-PAY

## 2018-03-19 VITALS — DIASTOLIC BLOOD PRESSURE: 107 MMHG | SYSTOLIC BLOOD PRESSURE: 152 MMHG

## 2018-03-19 VITALS — DIASTOLIC BLOOD PRESSURE: 114 MMHG | SYSTOLIC BLOOD PRESSURE: 149 MMHG

## 2018-03-19 VITALS — SYSTOLIC BLOOD PRESSURE: 147 MMHG | DIASTOLIC BLOOD PRESSURE: 105 MMHG

## 2018-03-19 VITALS — DIASTOLIC BLOOD PRESSURE: 78 MMHG | SYSTOLIC BLOOD PRESSURE: 133 MMHG

## 2018-03-19 VITALS — SYSTOLIC BLOOD PRESSURE: 123 MMHG | DIASTOLIC BLOOD PRESSURE: 93 MMHG

## 2018-03-19 VITALS — SYSTOLIC BLOOD PRESSURE: 148 MMHG | DIASTOLIC BLOOD PRESSURE: 104 MMHG

## 2018-03-19 VITALS — DIASTOLIC BLOOD PRESSURE: 105 MMHG | SYSTOLIC BLOOD PRESSURE: 149 MMHG

## 2018-03-19 VITALS — SYSTOLIC BLOOD PRESSURE: 129 MMHG | DIASTOLIC BLOOD PRESSURE: 89 MMHG

## 2018-03-19 VITALS — SYSTOLIC BLOOD PRESSURE: 149 MMHG | DIASTOLIC BLOOD PRESSURE: 104 MMHG

## 2018-03-19 VITALS — SYSTOLIC BLOOD PRESSURE: 148 MMHG | DIASTOLIC BLOOD PRESSURE: 107 MMHG

## 2018-03-19 VITALS — SYSTOLIC BLOOD PRESSURE: 149 MMHG | DIASTOLIC BLOOD PRESSURE: 115 MMHG

## 2018-03-19 VITALS — BODY MASS INDEX: 24.31 KG/M2 | HEIGHT: 68 IN | WEIGHT: 160.44 LBS

## 2018-03-19 DIAGNOSIS — E78.5: ICD-10-CM

## 2018-03-19 DIAGNOSIS — D72.829: ICD-10-CM

## 2018-03-19 DIAGNOSIS — I10: ICD-10-CM

## 2018-03-19 DIAGNOSIS — Q24.5: ICD-10-CM

## 2018-03-19 DIAGNOSIS — F41.9: ICD-10-CM

## 2018-03-19 DIAGNOSIS — I25.10: ICD-10-CM

## 2018-03-19 DIAGNOSIS — I21.19: Primary | ICD-10-CM

## 2018-03-19 DIAGNOSIS — E78.00: ICD-10-CM

## 2018-03-19 DIAGNOSIS — Z91.14: ICD-10-CM

## 2018-03-19 DIAGNOSIS — F32.9: ICD-10-CM

## 2018-03-19 DIAGNOSIS — I46.2: ICD-10-CM

## 2018-03-19 DIAGNOSIS — I49.01: ICD-10-CM

## 2018-03-19 DIAGNOSIS — F17.210: ICD-10-CM

## 2018-03-19 DIAGNOSIS — Z95.5: ICD-10-CM

## 2018-03-19 LAB
ALBUMIN SERPL-MCNC: 3.8 GM/DL (ref 3.2–4.5)
ALP SERPL-CCNC: 117 U/L (ref 40–136)
ALT SERPL-CCNC: 29 U/L (ref 0–55)
APTT BLD: 127 SEC (ref 24–35)
BASOPHILS # BLD AUTO: 0.1 10^3/UL (ref 0–0.1)
BASOPHILS NFR BLD AUTO: 0 % (ref 0–10)
BASOPHILS NFR BLD MANUAL: 0 %
BILIRUB SERPL-MCNC: 0.7 MG/DL (ref 0.1–1)
BUN/CREAT SERPL: 8
CALCIUM SERPL-MCNC: 8.9 MG/DL (ref 8.5–10.1)
CHLORIDE SERPL-SCNC: 103 MMOL/L (ref 98–107)
CO2 SERPL-SCNC: 21 MMOL/L (ref 21–32)
CREAT SERPL-MCNC: 0.86 MG/DL (ref 0.6–1.3)
EOSINOPHIL # BLD AUTO: 0.1 10^3/UL (ref 0–0.3)
EOSINOPHIL NFR BLD AUTO: 0 % (ref 0–10)
EOSINOPHIL NFR BLD MANUAL: 0 %
ERYTHROCYTE [DISTWIDTH] IN BLOOD BY AUTOMATED COUNT: 13.3 % (ref 10–14.5)
GFR SERPLBLD BASED ON 1.73 SQ M-ARVRAT: > 60 ML/MIN
GLUCOSE SERPL-MCNC: 120 MG/DL (ref 70–105)
HCT VFR BLD CALC: 42 % (ref 40–54)
HGB BLD-MCNC: 14.8 G/DL (ref 13.3–17.7)
INR PPP: 1.2 (ref 0.8–1.4)
LYMPHOCYTES # BLD AUTO: 1.4 X 10^3 (ref 1–4)
LYMPHOCYTES NFR BLD AUTO: 8 % (ref 12–44)
MAGNESIUM SERPL-MCNC: 1.9 MG/DL (ref 1.8–2.4)
MANUAL DIFFERENTIAL PERFORMED BLD QL: YES
MCH RBC QN AUTO: 31 PG (ref 25–34)
MCHC RBC AUTO-ENTMCNC: 35 G/DL (ref 32–36)
MCV RBC AUTO: 89 FL (ref 80–99)
MONOCYTES # BLD AUTO: 0.9 X 10^3 (ref 0–1)
MONOCYTES NFR BLD AUTO: 5 % (ref 0–12)
MONOCYTES NFR BLD: 4 %
MYOGLOBIN SERPL-MCNC: 543.3 NG/ML (ref 10–92)
NEUTROPHILS # BLD AUTO: 16.6 X 10^3 (ref 1.8–7.8)
NEUTROPHILS NFR BLD AUTO: 87 % (ref 42–75)
NEUTS BAND NFR BLD MANUAL: 86 %
NEUTS BAND NFR BLD: 1 %
PLATELET # BLD: 290 10^3/UL (ref 130–400)
PMV BLD AUTO: 10.1 FL (ref 7.4–10.4)
POTASSIUM SERPL-SCNC: 3.9 MMOL/L (ref 3.6–5)
PROT SERPL-MCNC: 7.4 GM/DL (ref 6.4–8.2)
PROTHROMBIN TIME: 14.8 SEC (ref 12.2–14.7)
RBC # BLD AUTO: 4.75 10^6/UL (ref 4.35–5.85)
RBC MORPH BLD: NORMAL
SODIUM SERPL-SCNC: 132 MMOL/L (ref 135–145)
VARIANT LYMPHS NFR BLD MANUAL: 9 %
WBC # BLD AUTO: 19.1 10^3/UL (ref 4.3–11)

## 2018-03-19 PROCEDURE — B2111ZZ FLUOROSCOPY OF MULTIPLE CORONARY ARTERIES USING LOW OSMOLAR CONTRAST: ICD-10-PCS | Performed by: INTERNAL MEDICINE

## 2018-03-19 PROCEDURE — 85730 THROMBOPLASTIN TIME PARTIAL: CPT

## 2018-03-19 PROCEDURE — 93041 RHYTHM ECG TRACING: CPT

## 2018-03-19 PROCEDURE — 82962 GLUCOSE BLOOD TEST: CPT

## 2018-03-19 PROCEDURE — B2151ZZ FLUOROSCOPY OF LEFT HEART USING LOW OSMOLAR CONTRAST: ICD-10-PCS | Performed by: INTERNAL MEDICINE

## 2018-03-19 PROCEDURE — 85027 COMPLETE CBC AUTOMATED: CPT

## 2018-03-19 PROCEDURE — 027034Z DILATION OF CORONARY ARTERY, ONE ARTERY WITH DRUG-ELUTING INTRALUMINAL DEVICE, PERCUTANEOUS APPROACH: ICD-10-PCS | Performed by: INTERNAL MEDICINE

## 2018-03-19 PROCEDURE — 93458 L HRT ARTERY/VENTRICLE ANGIO: CPT

## 2018-03-19 PROCEDURE — B3101ZZ FLUOROSCOPY OF THORACIC AORTA USING LOW OSMOLAR CONTRAST: ICD-10-PCS | Performed by: INTERNAL MEDICINE

## 2018-03-19 PROCEDURE — 83874 ASSAY OF MYOGLOBIN: CPT

## 2018-03-19 PROCEDURE — 3E03317 INTRODUCTION OF OTHER THROMBOLYTIC INTO PERIPHERAL VEIN, PERCUTANEOUS APPROACH: ICD-10-PCS | Performed by: INTERNAL MEDICINE

## 2018-03-19 PROCEDURE — 93005 ELECTROCARDIOGRAM TRACING: CPT

## 2018-03-19 PROCEDURE — 85007 BL SMEAR W/DIFF WBC COUNT: CPT

## 2018-03-19 PROCEDURE — 80053 COMPREHEN METABOLIC PANEL: CPT

## 2018-03-19 PROCEDURE — 93306 TTE W/DOPPLER COMPLETE: CPT

## 2018-03-19 PROCEDURE — 36415 COLL VENOUS BLD VENIPUNCTURE: CPT

## 2018-03-19 PROCEDURE — 71275 CT ANGIOGRAPHY CHEST: CPT

## 2018-03-19 PROCEDURE — 85610 PROTHROMBIN TIME: CPT

## 2018-03-19 PROCEDURE — 80320 DRUG SCREEN QUANTALCOHOLS: CPT

## 2018-03-19 PROCEDURE — 4A023N7 MEASUREMENT OF CARDIAC SAMPLING AND PRESSURE, LEFT HEART, PERCUTANEOUS APPROACH: ICD-10-PCS | Performed by: INTERNAL MEDICINE

## 2018-03-19 PROCEDURE — 84484 ASSAY OF TROPONIN QUANT: CPT

## 2018-03-19 PROCEDURE — 83735 ASSAY OF MAGNESIUM: CPT

## 2018-03-19 PROCEDURE — 87081 CULTURE SCREEN ONLY: CPT

## 2018-03-19 RX ADMIN — EPTIFIBATIDE SCH MLS/HR: 0.75 INJECTION INTRAVENOUS at 22:11

## 2018-03-19 RX ADMIN — METOPROLOL TARTRATE SCH MG: 50 TABLET, FILM COATED ORAL at 20:14

## 2018-03-19 RX ADMIN — SODIUM CHLORIDE SCH MLS/HR: 900 INJECTION, SOLUTION INTRAVENOUS at 15:34

## 2018-03-19 RX ADMIN — SODIUM CHLORIDE SCH MLS/HR: 900 INJECTION, SOLUTION INTRAVENOUS at 20:06

## 2018-03-19 RX ADMIN — INSULIN HUMAN SCH UNIT: 100 INJECTION, SOLUTION PARENTERAL at 20:14

## 2018-03-19 RX ADMIN — EPTIFIBATIDE SCH MLS/HR: 0.75 INJECTION INTRAVENOUS at 15:34

## 2018-03-19 RX ADMIN — MORPHINE SULFATE PRN MG: 4 INJECTION, SOLUTION INTRAMUSCULAR; INTRAVENOUS at 15:31

## 2018-03-19 RX ADMIN — MORPHINE SULFATE PRN MG: 4 INJECTION, SOLUTION INTRAMUSCULAR; INTRAVENOUS at 19:50

## 2018-03-19 RX ADMIN — ATORVASTATIN CALCIUM SCH MG: 80 TABLET, FILM COATED ORAL at 20:14
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
## 2018-03-19 NOTE — HISTORY & PHYSICIAL-CARDIOLGY
HPI-Cardiology


Cardiology Consultation:


Date of Consultation


3/19/18


Date of Admission





Attending Physician


LANDON Grijalva MD


Admitting Physician


[facility]


Consulting Physician


LANDON GRIJALVA MD





HPI:


Time Seen by Provider:  12:45


Chief Complaint:


Chest pain


This is a 48-year-old gentleman who has previous history of CAD with stent in 

OM1 in January 2013. A Promus 3.0x20mm SHAMAR was placed by Dr Lang in 1/2013. Dr Holt did coronary angiography in 10/2013 for exertional chest pain which did 

not reveal any significant CAD. Patient has been non-compliant with 

medications. He has been chest pain x 3 weeks. However, he has been having 

severe chest pain x 48 hours. Today he started having severe chest pain since 

at 7am. Intensity 11 out 10. Substernal. No response. No other cardiac symptoms.





Review of Systems-Cardiology


Review of Systems


Constitutional:  No As described under HPI, No no symptoms reported, No chills, 

No fever, No lightheadedness, No malaise, No tiredness, No weight loss, No 

weight gain, No other


Eyes:  No As described under HPI, No no symptoms reported, No blindness, No 

blurred vision, No contact lenses, No drainage, No decreased acuity, No foreign 

body sensation, No glasses, No inflammation, No pain, No photophobia, No 

previous injury, No shadows, No tunnel vision, No other, No vision change


Ears/Nose/Throat:  No As described under HPI, No no symptoms reported, No 

chronic hearing loss, No epistaxis, No ear discharge, No ear pain, No loose 

teeth, No mouth pain, No mouth swelling, No nasal drainage, No nose pain, No 

recent hearing loss, No throat pain, No throat swelling, No ulcerations, No 

other


Respiratory:  No no symptoms reported, No As described under HPI, No cough, No 

orthopnea, No shortness of breath, No SOB with excertion, No SOB at rest, No 

stridor, No wheezing, No other


Cardiovascular:  chest pain


Gastrointestinal:  No no symptoms reported, No As described under HPI, No 

abdomen distended, No abdominal pain, No blood streaked bowels, No constipation

, No diarrhea, No difficulty swallowing, No nausea, No poor appetite, No poor 

fluid intake, No rectal bleeding, No vomiting, No other, No nausea/vomiting/

diarrhea, No stool coloration changes


Genitourinary:  No no symptoms reported, No As described under HPI, No burning, 

No dysuria, No discharge, No frequency, No flank pain, No hematuria, No 

incontinence, No pain, No urgency, No other, No urine frequency changes, No 

urine coloration changes


Musculoskeletal:  No no symptoms reported, No As describe under HPI, No back 

pain, No gout, No joint pain, No joint swelling, No muscle pain, No muscle 

stiffness, No neck pain, No other


Skin:  No no symptoms reported, No As described under HPI, No change in color, 

No change in hair/nails, No dryness, No lesions, No lumps, No rash, No other, 

No skin related problems, No ulcerations, No rash on exposed areas, No 

ulcerations on exposed areas


Psychiatric/Neurological:  No no symptoms reported, No As described under HPI, 

No anxiety, No depression, No emotional problems, No headache, No numbness, No 

pre-existing deficit, No seizure, No tingling, No tremors, No weakness, No other

, No focal weakness, No syncope


Hematologic:  No no symptoms reported, No As described under HPI, No anemia, No 

blood clots, No easy bleeding, No easy bruising, No swollen glands, No other, 

No bleeding abnormalities





PMH-Social-Family Hx


Patient Social History


Type Used:  Cigarettes





Immunizations Up To Date


Tetanus Booster (TDap):  Unknown





Past Medical History


PMH


As described under Assessment.





Allergies and Home Medications


Allergies


Coded Allergies:  


     No Known Drug Allergies (Unverified , 1/4/13)





Home Medications


Aspirin 81 Mg Tab.chew, 81 MG PO DAILY, (Reported)


Atorvastatin Calcium 20 Mg Tablet, 20 MG PO DAILY, (Reported)


Pantoprazole Sodium 40 Mg Tablet.dr, 40 MG PO DAILY, (Reported)


Sucralfate 1 Gm Tablet, 1 GM PO ACHS, (Reported)





Patient Home Medication List


Home Medication List Reviewed:  Yes





Physical Exam-Cardiology


Physical Exam


Vital Signs/I&O





Vital Sign - Last 12Hours








 3/19/18 3/19/18 3/19/18





 14:30 14:43 14:45


 


Pulse 80 82 81


 


Resp 21  18


 


B/P (MAP) 129/89 (102)  123/93 (103)


 


Pulse Ox 97  98


 


O2 Delivery Room Air  Room Air





Capillary Refill :


Constitutional:  AAO x 3, apparent distress, well-developed


HEENT:  No PERRL, No normal ENT inspection, No TMs normal, No pharynx normal, 

No scleral icterus (R), No scleral icterus (L), No pale conjunctivae (R), No 

pale conjunctivae (L), No photophobia, No TM abnormal (R), No TM abnormal (L), 

No pharyngeal erythema, No tonsillar exudate, No other, No discharge, No EOMI, 

No hearing is well preserved, No hard of hearing, No oral hygience is good, No 

ulceration, No xanthelasmas are seen


Neck:  No non-tender, No full range of motion, No supple, No normal inspection, 

No carotid bruit, No limited range of motion, No lymphadenopathy (R), No 

lymphadenopathy (L), No tender lateral, No tender midline, No thyromegaly, No 

other, No carotid pulses are 2 + bilaterally, No with good upstrokes


Respiratory:  No accessory muscle use, No respiratory distress, No chest tender

, No chest expansion is symmetric, chest is bilaterally symmetric, No lungs 

clear to percussion, lungs clear to auscultation, No crackles, No rhonchi, No 

rales, No stridor, No wheezing, No pleural rub, No other


Cardiovascular:  regular rate-rhythm, No irregularly irregular, No extra beats, 

No parasternal heave is noted, No JVD, No edema, No bradycardia, No tachycardia

, No point of maximal impulse, No cardiac thrills are palpable, S1 and S2, No 

gallop/S3, No gallop/S4, No diastolic murmur, No systolic murmur, No friction 

rub, No click, No other


Gastrointestinal:  No tender, No soft, No round, No distended, No pulsatile mass

, No organomegaly, No guarding, No rebound, No tenderness, No hernia, No mass, 

No audible bowel sounds, No abnormal bowel sounds, No abdominal bruits, No 

spleenomegaly, No other


Rectal:  deferred


Extremities:  No normal range of motion, No non-tender, No normal inspection, 

No pedal edema, No calf tenderness, No normal capillary refill, No pelvis stable

, No calf tenderness, No inflammation, No pedal edema, No slow capillary refill

, No swelling, No other, No abrasion, No clubbing, No cyanosis, No ecchymosis, 

No laceration, No no lower extremity edema bilateral, No significant edema, No 

tenderness, No wound


Neurologic/Psychiatric:  No CNs II-XII nml as tested, No no motor/sensory 

deficits, alert, normal mood/affect, oriented x 3, No abnormal cerebellar tests

, No abnormal CNs II-XII, No abnormal gait, No aphasia, No EOM palsy, No facial 

droop, No motor weakness, No sensory deficit, No depressed affect, No 

disoriented x 3, No other, No grossly intact, No power is 5/5 both on sides


Skin:  No normal color, No warm/dry, No cyanosis, No cool, No diaphoresis, No 

damp, No ecchymosis, No jaundice, No mottled, No pallor, No rash, No tattoos/

piercings, No ulcerations, No rash on exposed areas, No ulcerations on exposed 

areas, No other


Lymphatic:  No no adenopathy, No axilla node tender (R), No axilla node tender (

L), No inguinal node tender (R), No inguinal node tender (L), No other





Data Review


Labs


Laboratory Tests


3/19/18 15:20: 


White Blood Count 19.1H, Red Blood Count 4.75, Hemoglobin 14.8, Hematocrit 42, 

Mean Corpuscular Volume 89, Mean Corpuscular Hemoglobin 31, Mean Corpuscular 

Hemoglobin Concent 35, Red Cell Distribution Width 13.3, Platelet Count 290, 

Mean Platelet Volume 10.1, Neutrophils (%) (Auto) 87H, Lymphocytes (%) (Auto) 8L

, Monocytes (%) (Auto) 5, Eosinophils (%) (Auto) 0, Basophils (%) (Auto) 0, 

Neutrophils # (Auto) 16.6H, Lymphocytes # (Auto) 1.4, Monocytes # (Auto) 0.9, 

Eosinophils # (Auto) 0.1, Basophils # (Auto) 0.1, Prothrombin Time 14.8H, INR 

Comment 1.2, Activated Partial Thromboplast Time 127*H, Sodium Level 132L, 

Potassium Level 3.9, Chloride Level 103, Carbon Dioxide Level 21, Anion Gap 8, 

Blood Urea Nitrogen 7, Creatinine 0.86, Estimat Glomerular Filtration Rate > 60

, BUN/Creatinine Ratio 8, Glucose Level 120H, Calcium Level 8.9, Magnesium 

Level 1.9, Total Bilirubin 0.7, Aspartate Amino Transf (AST/SGOT) 41H, Alanine 

Aminotransferase (ALT/SGPT) 29, Alkaline Phosphatase 117, Total Protein 7.4, 

Albumin 3.8





ECG Impression


ECG


Initial ECG Impression:  Acute MI





A/P-Cardiology


Assessment/Admission Diagnosis


STEMI,


Cardiac Arrest,


Leucocytosis


Admission Status:  Inpatient Order (span 2 midnights)


Reason for Inpatient Admission:  


STEMI,s/p Cardiac arrest





Plan


Acute inferior STEMI with polymorphic VT/ventricular fibrillation.  Treated in 

the emergency department with defibrillation which was successful.  CPR less 

than 1 minute.  Immediate normal mental status.  EKG showed inferior ST 

elevation MI catheter lab activated.  Plavix and aspirin bolus given.  IV 

amiodarone given.  Discussed cardiac catheterization with the patient and 

family and risk of death and worsening MI discussed.  Patient will be taken 

emergently for coronary angiography and primary PCI.  Leukocytosis likely 

secondary to acute MI.











LANDON GRIJALVA MD Mar 19, 2018 2:19 pm
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
## 2018-03-19 NOTE — ED CPR
HPI-CPR


General


Stated Complaint:  CP





History of Present Illness


Date Seen by Provider:  Mar 19, 2018


Time Seen by Provider:  12:54


Initial Comments


This 48-year-old gentleman presents to the emergency room in acute distress 

with apparent acute coronary syndrome.  He has a known history of heart disease 

and had a stent placed by Dr. Lang several years ago.  He has not been taking 

his medications for about the past 2 years according to his wife.  By the time 

he was lifted into the bed he became pulseless and developed agonal breathing.  

CPR was initiated.  He was placed on the monitor and found to be in V. fib.  

Defibrillation was pursued which converted him to a bigeminy rhythm with pulse.

  Patient again became alert and was talking to staff.  Wife reports the 

noncompliance with medications.  He also stopped following with cardiology.  

She states prior history of drug and alcohol use but is not aware of anything 

recent.  Patient is profoundly diaphoretic and in distress.





Allergies and Home Medications


Allergies


Coded Allergies:  


     No Known Drug Allergies (Unverified , 1/4/13)





Home Medications


Aspirin 81 Mg Tab.chew, 81 MG PO DAILY, (Reported)


Atorvastatin Calcium 20 Mg Tablet, 20 MG PO DAILY, (Reported)


Pantoprazole Sodium 40 Mg Tablet.dr, 40 MG PO DAILY, (Reported)


Sucralfate 1 Gm Tablet, 1 GM PO ACHS, (Reported)





Patient Home Medication List


Home Medication List Reviewed:  Yes





Review of Systems


Constitutional:  see HPI


EENTM:  No Symptoms Reported


Respiratory:  No Symptoms Reported


Cardiovascular:  See HPI


Gastrointestinal:  No Symptoms Reported


Genitourinary:  No Symptoms Reported


Musculoskeletal:  no symptoms reported


Skin:  no symptoms reported


Psychiatric/Neurological:  See HPI


Endocrine:  No Symptoms Reported





Past Medical-Social-Family Hx


Patient Social History


Recreational Drug Use:  Yes (Prior use)


Smoking Status:  Current Everyday Smoker


Type Used:  Cigarettes


Recent Hopitalizations:  Yes





Immunizations Up To Date


Tetanus Booster (TDap):  Unknown


PED Vaccines UTD:  No





Surgeries


History of Surgeries:  Yes (JAW FX REPAIR)


Surgeries:  Cardiac, Coronary Stent





Respiratory


History of Respiratory Disorde:  No





Cardiovascular


History of Cardiac Disorders:  Yes (STENT X 1 )


Cardiac Disorders:  Coronary Artery Disease, Heart Attack, Heart Murmur, High 

Cholesterol, Hypertension





Neurological


History of Neurological Disord:  No





Reproductive System


Hx Reproductive Disorders:  No


Sexually Transmitted Disease:  No


HIV/AIDS:  No





Genitourinary


History of Genitourinary Disor:  Yes


Genitourinary Disorders:  Kidney Stones





Gastrointestinal


History of Gastrointestinal Di:  Yes (HX OF BLOOD IN STOOL)





Musculoskeletal


History of Musculoskeletal Dis:  Yes


Musculoskeletal Disorders:  Arthritis, Chronic Back Pain





Endocrine


History of Endocrine Disorders:  No





HEENT


Loss of Vision:  Denies


Hearing Impairment:  Denies





Cancer


History of Cancer:  No





Psychosocial


History of Psychiatric Problem:  Yes (hAS NOT BEEN DX W/ MENTAL DISORDER )


Behavioral Health Disorders:  Anxiety, Depression





Integumentary


History of Skin or Integumenta:  No





Blood Transfusions


History of Blood Disorders:  No





Physical Exam


Vital Signs





Vital Signs - First Documented








 3/19/18 3/19/18





 12:49 13:00


 


Temp 96.9 


 


Pulse 110 


 


Resp 24 


 


B/P (MAP) 186/111 (136) 


 


Pulse Ox 96 


 


O2 Delivery Room Air 


 


O2 Flow Rate  3.00





Capillary Refill :


General Appearance:  WD/WN, Severe Distress


HEENT:  PERRL/EOMI, Normal ENT Inspection


Neck:  Normal Inspection


Respiratory:  Lungs Clear, Normal Breath Sounds, No Accessory Muscle Use, No 

Respiratory Distress


Cardiovascular:  Other (V-Fib and bigeminy on the monitor.  Initially pulseless)


Gastrointestinal:  Soft, No Distended


Extremity:  Normal Inspection, No Pedal Edema


Neurologic/Psychiatric:  Alert, Oriented x3, Other (Alert and oriented during 

rhythm with pulse.  Unresponsive during pulseless rhythm.  Patient would become 

alert during perfusing during CPR)


Skin:  Diaphoresis, Pallor





Progress/Results/Core Measures


Results/Orders


My Orders





Orders - GRETA SIMONS MD


Aspirin Chewable Tablet (Baby Aspirin Ch (3/19/18 12:53)


Clopidogrel Tablet (Plavix Tablet) (3/19/18 12:53)


Lidocaine 1% (Xylocaine 1%) (3/19/18 12:54)


Midazolam Injection (Versed Injection) (3/19/18 12:54)


Fentanyl  Injection (Sublimaze Injection (3/19/18 12:54)


Heparin (Bolus Per Protocol) (Heparin (B (3/19/18 12:54)


Ns Iv 1000 Ml (Sodium Chloride 0.9%) (3/19/18 12:55)


Nitro Drip 57434 Mcg/D5w (Nitroglycerin (3/19/18 12:55)


Heparin (Cath Lab) (Heparin (Cath Lab)) (3/19/18 12:55)


Cbc With Automated Diff (3/19/18 12:58)


Magnesium (3/19/18 12:58)


Ekg Tracing (3/19/18 12:58)


Cardiac Profile 1 (3/19/18 12:58)


Comprehensive Metabolic Panel (3/19/18 12:58)


Myoglobin Serum (3/19/18 12:58)


Protime With Inr (3/19/18 12:58)


Partial Thromboplastin Time (3/19/18 12:58)


O2 (3/19/18 12:58)


Monitor-Rhythm Ecg Trace Only (3/19/18 12:58)


Aspirin Chewable Tablet (Baby Aspirin Ch (3/19/18 13:00)


Saline Lock/Iv-Start (3/19/18 12:58)


Clopidogrel Tablet (Plavix Tablet) (3/19/18 13:00)


Manual Differential (3/19/18 15:20)





Vital Signs/I&O





Vital Sign - Last 12Hours








 3/19/18 3/19/18





 12:49 13:00


 


Temp 96.9 


 


Pulse 110 


 


Resp 24 


 


B/P (MAP) 186/111 (136) 


 


Pulse Ox 96 96


 


O2 Delivery Room Air Nasal Cannula


 


O2 Flow Rate  3.00








Progress Note :  


   Time:  13:06


Progress Note


Patient presented in acute coronary syndrome clutching his chest in severe 

distress.  By the time he was placed in the bed he became pulseless and 

unresponsive and developed agonal breathing.  CPR was initiated and V. fib was 

noted on the monitor.  Patient was defibrillated which brought him into 

bigeminy with a pulse.  CPR was conducted for approximately 1 minute.  ST 

elevation was noted monitor.  Patient lapsed into V. fib again and required a 

second defibrillation.  He received aspirin 324 mg, Plavix 300 mg, and 

amiodarone 300 mg by IV route.  Dr. Najera was immediately called and sent Dr. Grijalva over for immediate assessment.  Dr. Grijalva promptly presented to the ER 

and patient has been taken to the Cath Lab for immediate treatment of acute MI.





ECG


Initial ECG Impression Date:  Mar 19, 2018


Initial ECG Impression Time:  12:56


Initial ECG Rate:  102


Initial ECG Rhythm:  S.Tach


Comment


Sinus tachycardia with ST elevation and depression in multiple leads.  Acute 

myocardial infarction noted.


EKG :  


   EKG Time:  12:58


   Rate:  133


Comment


This EKG demonstrated sinus tachycardia with ST elevations suggestive of a MI.  

This rhythm converted into V. fib.





Critical Care Note


Critical Care


Start Time:  12:54


Stop Time:  13:05


Total Time (minutes)


11





Departure


Impression


Impression:  


 Primary Impression:  


 Ventricular fibrillation


 Additional Impression:  


 Cardiac arrest


Disposition:  09 ADMITTED AS INPATIENT


Condition:  Critical





Departure-Patient Inst.


Referrals:  


[facility] (PCP/Family)


Primary Care Physician





Copy


Copies To 1:   LANDON GRIJALVA MD, JOSHUA T MD Mar 19, 2018 13:11
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
## 2018-03-19 NOTE — XMS REPORT
Continuity of Care Document

 Created on: 2018



ROSE LALA

External Reference #: [id number]

: 1970

Sex: Male



Demographics







 Address  [address]  [address]

 

 Home Phone  [phone] x

 

 Preferred Language  Unknown

 

 Marital Status  Unknown

 

 [employer] Affiliation  Unknown

 

 Race  Unknown

 

 Ethnic Group  Unknown





Author







 Author  [facility]

 

 Address  Unknown

 

 Phone  Unavailable



              



Allergies

      





 Active            Description            Code            Type            
Severity            Reaction            Onset            Reported/Identified   
         Relationship to Patient            Clinical Status        

 

 Yes            No Known Drug Allergies            [id number]            Drug 
Allergy            Unknown            N/A                         2013   
                               

 

 Yes            Zoloft 100 mg tablet                         Drug Allergy      
      N/A            N/A                         2014                    
              

 

 Yes            Wellbutrin 100 mg tablet                         Drug Allergy  
          N/A            N/A                         2014                
                  



                      



Medications

      



There is no data.                  



Problems

      





 Date Dx Coded            Attending            Type            Code            
Diagnosis            Diagnosed By        

 

 2013                         Ot            305.00            ALCOHOL 
ABUSE-UNSPEC                     

 

 2013                         Ot            305.1            TOBACCO USE 
DISORDER                     

 

 2013                         Ot            305.90            DRUG ABUSE 
NEC-UNSPEC                     

 

 2013                         Ot            414.01            CORONARY 
ATHEROSCLEROSIS OF NATIVE CORON                     

 

 2013                         Ot            786.50            CHEST PAIN 
NOS                     

 

 2013                         Ot            V17.49            FAMILY 
HISTORY OF OTHER CARDIOVASCULAR D                     

 

 2013            MYKE PHILLIPS DO                         300.00         
   ANXIETY UNSPEC                     

 

 2013            MYKE PHILLIPS DO                         780.99         
   ANHEDONIA                     

 

 2013            BALAJI STARR                         300.00    
        ANXIETY UNSPEC                     

 

 2013            BALAJI STARR                         780.99    
        ANHEDONIA                     

 

 2013                                      300.00            ANXIETY 
UNSPEC                     

 

 2013                                      780.99            ANHEDONIA   
                  

 

 2013            MYKE PHILLIPS DO                         300.00         
   ANXIETY UNSPEC                     

 

 2013            MYKE PHILLIPS DO                         780.99         
   ANHEDONIA                     

 

 2013            KIANA GALLEGOS                         300.00   
         ANXIETY UNSPEC                     

 

 2013            KIANA GALLEGOS                         780.99   
         ANHEDONIA                     

 

 2013            MARILU AGUILAR MD                         300.00      
      ANXIETY UNSPEC                     

 

 2013            MARILU AGUILAR MD                         780.99      
      ANHEDONIA                     

 

 2013            MARILU AGUILAR MD                         300.00      
      ANXIETY UNSPEC                     

 

 2013            MARILU AGUILAR MD                         780.99      
      ANHEDONIA                     

 

 2013            MARILU AGUILAR MD                         300.00      
      ANXIETY UNSPEC                     

 

 2013            MARILU AGUILAR MD                         780.99      
      ANHEDONIA                     

 

 2013            NATALIA BAUTISTA DDS                         300.00     
       ANXIETY UNSPEC                     

 

 2013            NATALIA BAUTISTA DDS                         780.99     
       ANHEDONIA                     

 

 2013            BALAJI STARR                         719.41    
        PAIN IN JOINT INVOLVING SHOULDER REGION                     

 

 2013                                      719.41            PAIN IN 
JOINT INVOLVING SHOULDER REGION                     

 

 2013            MYKE PHILLIPS DO                         719.41         
   PAIN IN JOINT INVOLVING SHOULDER REGION                     

 

 2013            KIANA GALLEGOS                         719.41   
         PAIN IN JOINT INVOLVING SHOULDER REGION                     

 

 2013            MARILU AGUILAR MD                         719.41      
      PAIN IN JOINT INVOLVING SHOULDER REGION                     

 

 2013            MARILU AGUILAR MD                         719.41      
      PAIN IN JOINT INVOLVING SHOULDER REGION                     

 

 2013            MARILU AGUILAR MD                         719.41      
      PAIN IN JOINT INVOLVING SHOULDER REGION                     

 

 2013            NATALIA BAUTISTA DDS                         719.41     
       PAIN IN JOINT INVOLVING SHOULDER REGION                     

 

 2013            MYKE PHILLIPS DO                         786.50         
   CHEST PAIN                     

 

 2013            KIANA GALLEGOS S                         786.50   
         CHEST PAIN                     

 

 2013            MARILU AGUILAR MD                         786.50      
      CHEST PAIN                     

 

 2013            MARILU AGUILAR MD                         786.50      
      CHEST PAIN                     

 

 2013            MARILU AGUILAR MD                         786.50      
      CHEST PAIN                     

 

 2013            NATALIA BAUTISTA DDS                         786.50     
       CHEST PAIN                     

 

 10/01/2013            TESSIE COPE MD, FACC FACP CCDS            Ot            
272.4            HYPERLIPIDEMIA NEC/NOS                     

 

 10/01/2013            TESSIE COPE MD, FACC FACP CCDS            Ot            
305.00            ALCOHOL ABUSE-UNSPEC                     

 

 10/01/2013            TESSIE COPE MD, FACC FACP CCDS            Ot            
305.1            TOBACCO USE DISORDER                     

 

 10/01/2013            TESSIE COPE MD, FACC FACP CCDS            Ot            
414.01            CORONARY ATHEROSCLEROSIS OF NATIVE CORON                     

 

 10/01/2013            TESSIE COPE MD, FACC FACP CCDS            Ot            
414.4            CORONARY ATHEROSCLEROSIS DUE TO CALCIFIE                     

 

 10/01/2013            TESSIE COPE MD, FACC FACP CCDS            Ot            
786.59            CHEST PAIN NEC                     

 

 10/01/2013            TESSIE COPE MD, FACC FACP CCDS            Ot            
V15.81            HX OF PAST NONCOMPLIANCE                     

 

 10/01/2013            [facility]            [facility]            
V45.82            PERCUTANEOUS TRANSLUM CORON ANGIOPLASTY                      

 

 10/01/2013            [facility]            Ot            
V58.63            LONG-TERM(CURRENT)USE OF ANTIPLATELET/AN                     

 

 10/01/2013            PRIS[facility]            Ot            
V58.69            OTH MED,LT,CURRENT USE                     

 

 10/25/2013            PHILLIPS DO MYKE K                         272.4          
  HYPERLIPIDEMIA                     

 

 10/25/2013            PHILLIPS DO MYKE K                         401.1          
  HYPERTENSION, BENIGN ESSENTIAL                     

 

 10/25/2013            PHILLIPS DO, MYKE K                         414.00         
   CORONARY ATHEROSCLEROSIS OF UNSPECIFIED TYPE OF VESSEL NATIVE OR GRAFT      
               

 

 10/25/2013            KIANA GALLEGOS S                         272.4    
        HYPERLIPIDEMIA                     

 

 10/25/2013            KIANA GALLEGOS S                         401.1    
        HYPERTENSION, BENIGN ESSENTIAL                     

 

 10/25/2013            FERMIN GALLEGOSA S                         414.00   
         CORONARY ATHEROSCLEROSIS OF UNSPECIFIED TYPE OF VESSEL NATIVE OR GRAFT
                     

 

 10/25/2013            MARILU AGUILAR MD N                         272.4       
     HYPERLIPIDEMIA                     

 

 10/25/2013            MARILU AGUILAR MD N                         401.1       
     HYPERTENSION, BENIGN ESSENTIAL                     

 

 10/25/2013            MARILU AGUILAR MD N                         414.00      
      CORONARY ATHEROSCLEROSIS OF UNSPECIFIED TYPE OF VESSEL NATIVE OR GRAFT   
                  

 

 10/25/2013            MARILU AGUILAR MD N                         272.4       
     HYPERLIPIDEMIA                     

 

 10/25/2013            MARILU AGUILAR MD N                         401.1       
     HYPERTENSION, BENIGN ESSENTIAL                     

 

 10/25/2013            MARILU AGUILAR MD N                         414.00      
      CORONARY ATHEROSCLEROSIS OF UNSPECIFIED TYPE OF VESSEL NATIVE OR GRAFT   
                  

 

 10/25/2013            MARILU AGUILAR MD N                         272.4       
     HYPERLIPIDEMIA                     

 

 10/25/2013            MARILU AGUILAR MD N                         401.1       
     HYPERTENSION, BENIGN ESSENTIAL                     

 

 10/25/2013            MARILU AGUILAR MD N                         414.00      
      CORONARY ATHEROSCLEROSIS OF UNSPECIFIED TYPE OF VESSEL NATIVE OR GRAFT   
                  

 

 10/25/2013            WHITE DDS, NATALIA J                         272.4      
      HYPERLIPIDEMIA                     

 

 10/25/2013            WHITE DDS, NATALIA J                         401.1      
      HYPERTENSION, BENIGN ESSENTIAL                     

 

 10/25/2013            WHITE DDS, NATALIA J                         414.00     
       CORONARY ATHEROSCLEROSIS OF UNSPECIFIED TYPE OF VESSEL NATIVE OR GRAFT  
                   

 

 2013            BRUNILDA APRN, KIANA S                         525.9    
        UNSPECIFIED DISORDER OF THE TEETH AND SUPPORTING STRUCTURES            
         

 

 2013            MARILU AGUILAR MD N                         525.9       
     UNSPECIFIED DISORDER OF THE TEETH AND SUPPORTING STRUCTURES               
      

 

 2013            MARILU AGUILAR MD N                         525.9       
     UNSPECIFIED DISORDER OF THE TEETH AND SUPPORTING STRUCTURES               
      

 

 2013            MARILU AGUILAR MD N                         525.9       
     UNSPECIFIED DISORDER OF THE TEETH AND SUPPORTING STRUCTURES               
      

 

 2013            NATALIA BAUTISTA DDS                         525.9      
      UNSPECIFIED DISORDER OF THE TEETH AND SUPPORTING STRUCTURES              
       

 

 2014            MARILU AGUILAR MD N                         303.90      
      OTHER AND UNSPECIFIED ALCOHOL DEPENDENCE UNSPECIFIED DRINKING BEHAVIOR   
                  

 

 2014            MARILU AGUILAR MD N                         578.1       
     BLOOD IN STOOL                     

 

 2014            MARILU AGUILAR MD N                         786.30      
      HEMOPTYSIS UNSPECIFIED                     

 

 2014            MARILU AGUILAR MD N                         303.90      
      OTHER AND UNSPECIFIED ALCOHOL DEPENDENCE UNSPECIFIED DRINKING BEHAVIOR   
                  

 

 2014            MARILU AGUILAR MD N                         578.1       
     BLOOD IN STOOL                     

 

 2014            MARILU AGUILAR MD N                         786.30      
      HEMOPTYSIS UNSPECIFIED                     

 

 2014            MARILU AGUILAR MD N                         303.90      
      OTHER AND UNSPECIFIED ALCOHOL DEPENDENCE UNSPECIFIED DRINKING BEHAVIOR   
                  

 

 2014            MARILU AGUILAR MD N                         578.1       
     BLOOD IN STOOL                     

 

 2014            MARILU AGUILAR MD N                         786.30      
      HEMOPTYSIS UNSPECIFIED                     

 

 2014            NATALIA BAUTISTA DDS                         303.90     
       OTHER AND UNSPECIFIED ALCOHOL DEPENDENCE UNSPECIFIED DRINKING BEHAVIOR  
                   

 

 2014            NATALIA BAUTISTA DDS                         578.1      
      BLOOD IN STOOL                     

 

 2014            NATALIA BAUTISTA DDS                         786.30     
       HEMOPTYSIS UNSPECIFIED                     

 

 2014            STEVIE REAVES DO            Ot            305.00         
   ALCOHOL ABUSE-UNSPEC                     

 

 2014            STEVIE REAVES DO            Ot            305.90         
   DRUG ABUSE NEC-UNSPEC                     

 

 2014            STEVIE REAVES DO            Ot            780.97         
   ALTERED MENTAL STATUS                     

 

 2014            MARILU AGUILAR MD N                         356.9       
     UNSPECIFIED IDIOPATHIC PERIPHERAL NEUROPATHY                     

 

 2014            NATALIA BAUTISTA DDS J                         356.9      
      UNSPECIFIED IDIOPATHIC PERIPHERAL NEUROPATHY                     

 

 2015            NATALIA BAUTISTA DDS                         786.50     
       CHEST PAIN                     

 

 2015                         Ot            414.00                       
           

 

 2015            BAIMA, SURI L ARNP            Ot            272.4    
                              

 

 2015            BAIMA, SURI L ARNP            Ot            401.9    
                              

 

 2015            BAIMA, SURI L ARNP            Ot            414.00   
                               

 

 2015            BAIMA, SURI L ARNP            Ot            V58.69   
                               

 

 2015            WHITE DDS, NATALIA J                         V01.6      
      EXPOSURE TO VENEREAL DISEASES                     

 

 2015            MARILU AGUILAR MD            Ot            008.8      
      VIRAL ENTERITIS NOS                     

 

 2015            MARILU AGUILAR MD            Ot            272.0      
      PURE HYPERCHOLESTEROLEM                     

 

 2015            MARILU AGUILAR MD            Ot            305.20     
       CANNABIS ABUSE-UNSPEC                     

 

 2015            MARILU AGUILAR MD            Ot            401.9      
      HYPERTENSION NOS                     

 

 2015            MARILU AGUILAR MD            Ot            414.01     
       CORONARY ATHEROSCLEROSIS OF NATIVE CORON                     

 

 2015            MARILU AGUILAR MD            Ot            V11.3      
      HX OF ALCOHOLISM                     

 

 2015            MARILU AGUILAR MD            Ot            V13.01     
       PERSONAL HISTORY OF URINARY CALCULI                     

 

 2015            MARILU AGUILAR MD            Ot            V45.82     
       PERCUTANEOUS TRANSLUM CORON ANGIOPLASTY                      

 

 2015                         Ot            414.00                       
           

 

 2015            BAIMA, SURI L ARNP            Ot            272.4    
                              

 

 2015            BAIMA, SURI L ARNP            Ot            401.9    
                              

 

 2015            BAIMA, SURI L ARNP            Ot            414.00   
                               

 

 2015            BAIMA, SURI L ARNP            Ot            V58.69   
                               

 

 2015            BAIMA, SURI L ARNP            Ot            272.4    
                              

 

 2015            BAIMA, SURI L ARNP            Ot            414.00   
                               

 

 2015            BAIMA, SURI L ARNP            Ot            786.50   
                               

 

 2017                         Ot            414.00            CORON 
ATHEROSCLER NOS TYPE VESSEL, NATIV                     

 

 2017            BAIMA, SURI L ARNP            Ot            272.4    
        HYPERLIPIDEMIA NEC/NOS                     

 

 2017            BAIMA, SURI L ARNP            Ot            401.9    
        HYPERTENSION NOS                     

 

 2017            BAIMA, SURI L ARNP            Ot            414.00   
         CORON ATHEROSCLER NOS TYPE VESSEL, NATIV                     

 

 2017            BAIMA, SURI L ARNP            Ot            V58.69   
         OTH MED,LT,CURRENT USE                     

 

 2017            BAIMA, SURI L ARNP            Ot            272.4    
        HYPERLIPIDEMIA NEC/NOS                     

 

 2017            BAIMA, SURI L ARNP            Ot            414.00   
         CORON ATHEROSCLER NOS TYPE VESSEL, NATIV                     

 

 2017            BAIMA, SURI L ARNP            Ot            786.50   
         CHEST PAIN NOS                     

 

 2017            KASHMIR CRAWLEY, TUSHAR DIAMOND            Ot            F11.151      
      OPIOID ABUSE W OPIOID-INDUCED PSYCHOTIC                      

 

 2017            TUSHAR MARLOW MD            Ot            F17.210      
      NICOTINE DEPENDENCE, CIGARETTES, UNCOMPL                     

 

 2017                         Ot            414.00            CORON 
ATHEROSCLER NOS TYPE VESSEL, NATIV                     

 

 2017            BAIMA, SURI L ARNP            Ot            272.4    
        HYPERLIPIDEMIA NEC/NOS                     

 

 2017            BAIMA, SURI L ARNP            Ot            401.9    
        HYPERTENSION NOS                     

 

 2017            BAIMA, SURI L ARNP            Ot            414.00   
         CORON ATHEROSCLER NOS TYPE VESSEL, NATIV                     

 

 2017            BAIMA, SURI L ARNP            Ot            V58.69   
         OTH MED,LT,CURRENT USE                     

 

 2017            BAIMA, SURI L ARNP            Ot            272.4    
        HYPERLIPIDEMIA NEC/NOS                     

 

 2017            BAIMA, SURI L ARNP            Ot            414.00   
         CORON ATHEROSCLER NOS TYPE VESSEL, NATIV                     

 

 2017            BAIMA, SURI L ARNP            Ot            786.50   
         CHEST PAIN NOS                     

 

 2017                         Ot            414.00            CORON 
ATHEROSCLER NOS TYPE VESSEL, NATIV                     

 

 2017            BAIMA, SURI L ARNP            Ot            272.4    
        HYPERLIPIDEMIA NEC/NOS                     

 

 2017            BAIMA, SURI L ARNP            Ot            401.9    
        HYPERTENSION NOS                     

 

 2017            BAIMA, SURI L ARNP            Ot            414.00   
         CORON ATHEROSCLER NOS TYPE VESSEL, NATIV                     

 

 2017            BAIMA, SURI L ARNP            Ot            V58.69   
         OTH MED,LT,CURRENT USE                     

 

 2017            BAIMA, SURI L ARNP            Ot            272.4    
        HYPERLIPIDEMIA NEC/NOS                     

 

 2017            BAIMA, SURI L ARNP            Ot            414.00   
         CORON ATHEROSCLER NOS TYPE VESSEL, NATIV                     

 

 2017            BAIMA, SURI L ARNP            Ot            786.50   
         CHEST PAIN NOS                     

 

 2017                         Ot            414.00            CORON 
ATHEROSCLER NOS TYPE VESSEL, NATIV                     

 

 2017            BAIMA, SURI L ARNP            Ot            272.4    
        HYPERLIPIDEMIA NEC/NOS                     

 

 2017            BAIMA, SURI L ARNP            Ot            401.9    
        HYPERTENSION NOS                     

 

 2017            BAIMA, SURI L ARNP            Ot            414.00   
         CORON ATHEROSCLER NOS TYPE VESSEL, NATIV                     

 

 2017            BAIMA, SURI L ARNP            Ot            V58.69   
         OTH MED,LT,CURRENT USE                     

 

 2017            BAIMA, SURI L ARNP            Ot            272.4    
        HYPERLIPIDEMIA NEC/NOS                     

 

 2017            BAIMA, SURI L ARNP            Ot            414.00   
         CORON ATHEROSCLER NOS TYPE VESSEL, NATIV                     

 

 2017            BAIMA, SURI L ARNP            Ot            786.50   
         CHEST PAIN NOS                     

 

 2017                         Ot            414.00            CORON 
ATHEROSCLER NOS TYPE VESSEL, NATIV                     

 

 2017            BAIMA, SURI L ARNP            Ot            272.4    
        HYPERLIPIDEMIA NEC/NOS                     

 

 2017            BAIMA, SURI L ARNP            Ot            401.9    
        HYPERTENSION NOS                     

 

 2017            BAIMA, SURI L ARNP            Ot            414.00   
         CORON ATHEROSCLER NOS TYPE VESSEL, NATIV                     

 

 2017            BAIMA, SURI L ARNP            Ot            V58.69   
         OTH MED,LT,CURRENT USE                     

 

 2017            BAIMA, SURI L ARNP            Ot            272.4    
        HYPERLIPIDEMIA NEC/NOS                     

 

 2017            BAIMA, SURI L ARNP            Ot            414.00   
         CORON ATHEROSCLER NOS TYPE VESSEL, NATIV                     

 

 2017            BAIMA, SURI L ARNP            Ot            786.50   
         CHEST PAIN NOS                     



                                                                               
                                                                               
                                                                               
                                                                               
      



Procedures

      





 Code            Description            Performed By            Performed On   
     

 

             Cardiolog                                  Eugene Lang            
                       2013        

 

             77169                                  THERAPUTIC INJ SQ/IM       
                            2013        

 

                                               ROCEPHIN INJ 1 g           
                        2013        

 

             94514                                  THERAPUTIC INJ SQ/IM       
                            2013        

 

                                               ROCEPHIN INJ 1 g           
                        2013        

 

             GENERAL S                                  ANTHONY ZAPATA          
                         2014        

 

             72027                                  XRAY CERVICAL SPINE, 2 OR 3 
VIEWS                                   2014        

 

             84644                                  XRAY LUMBAR SPINE 2 OR 3 
VIEWS                                   2014        

 

             37099                                  CMP                        
           2014        

 

             99858                                  VIT B 12                   
                2014        

 

             83072                                  FOLATE                     
              2014        

 

             17829                                  NUCLEAR STRESS TESTING     
                              2015        

 

             02439                                  OXIMETRY                   
                2015        



                                          



Results

      





 Test            Result            Range        









 PT panel in platelet poor plasma by coagulation assay - 17 16:36         









 Prothrombin time (PT) in platelet poor plasma by coagulation assay            
13.3 s            12.2-14.7        

 

 INR in platelet poor plasma or blood by coagulation assay            1.0      
       0.8-1.4        









 Comprehensive metabolic panel - 17 16:36         









 Serum or plasma sodium measurement (moles/volume)            136 mmol/L       
     135-145        

 

 Serum or plasma potassium measurement (moles/volume)            3.6 mmol/L    
        3.6-5.0        

 

 Serum or plasma chloride measurement (moles/volume)            105 mmol/L     
               

 

 Carbon dioxide            20 mmol/L            21-32        

 

 Serum or plasma anion gap determination (moles/volume)            11 mmol/L   
         5-14        

 

 Serum or plasma urea nitrogen measurement (mass/volume)            12 mg/dL   
         7-18        

 

 Serum or plasma creatinine measurement (mass/volume)            0.99 mg/dL    
        0.60-1.30        

 

 Serum or plasma urea nitrogen/creatinine mass ratio            12             
NRG        

 

 Serum or plasma creatinine measurement with calculation of estimated 
glomerular filtration rate            >             NRG        

 

 Serum or plasma glucose measurement (mass/volume)            89 mg/dL         
           

 

 Serum or plasma calcium measurement (mass/volume)            9.3 mg/dL        
    8.5-10.1        

 

 Serum or plasma total bilirubin measurement (mass/volume)            1.2 mg/dL
            0.1-1.0        

 

 Serum or plasma alkaline phosphatase measurement (enzymatic activity/volume)  
          117 U/L                    

 

 Serum or plasma aspartate aminotransferase measurement (enzymatic activity/
volume)            19 U/L            5-34        

 

 Serum or plasma alanine aminotransferase measurement (enzymatic activity/volume
)            21 U/L            0-55        

 

 Serum or plasma protein measurement (mass/volume)            7.2 g/dL         
   6.4-8.2        

 

 Serum or plasma albumin measurement (mass/volume)            4.2 g/dL         
   3.2-4.5        









 Serum or plasma salicylates measurement (mass/volume) - 17 16:36         









 Serum or plasma salicylates measurement (mass/volume)            < mg/dL      
      5.0-20.0        









 Serum or plasma acetaminophen measurement (mass/volume) - 17 16:36      
   









 Serum or plasma acetaminophen measurement (mass/volume)            < ug/mL    
        10-30        









 Serum or plasma ethanol measurement (mass/volume) - 17 16:36         









 Serum or plasma ethanol measurement (mass/volume)            < mg/dL          
  <10        









 Complete blood count (CBC) with automated white blood cell (WBC) differential 
- 17 16:36         









 Blood leukocytes automated count (number/volume)            9.4 10*3/uL       
     4.3-11.0        

 

 Blood erythrocytes automated count (number/volume)            4.90 10*6/uL    
        4.35-5.85        

 

 Venous blood hemoglobin measurement (mass/volume)            15.1 g/dL        
    13.3-17.7        

 

 Blood hematocrit (volume fraction)            43 %            40-54        

 

 Automated erythrocyte mean corpuscular volume            87 [foz_us]          
  80-99        

 

 Automated erythrocyte mean corpuscular hemoglobin (mass per erythrocyte)      
      31 pg            25-34        

 

 Automated erythrocyte mean corpuscular hemoglobin concentration measurement (
mass/volume)            35 g/dL            32-36        

 

 Automated erythrocyte distribution width ratio            12.7 %            
10.0-14.5        

 

 Automated blood platelet count (count/volume)            276 10*3/uL          
  130-400        

 

 Automated blood platelet mean volume measurement            10.2 [foz_us]     
       7.4-10.4        

 

 Automated blood neutrophils/100 leukocytes            69 %            42-75   
     

 

 Automated blood lymphocytes/100 leukocytes            19 %            12-44   
     

 

 Blood monocytes/100 leukocytes            9 %            0-12        

 

 Automated blood eosinophils/100 leukocytes            2 %            0-10     
   

 

 Automated blood basophils/100 leukocytes            0 %            0-10        

 

 Blood neutrophils automated count (number/volume)            6.5 10*3         
   1.8-7.8        

 

 Blood lymphocytes automated count (number/volume)            1.8 10*3         
   1.0-4.0        

 

 Blood monocytes automated count (number/volume)            0.9 10*3            
0.0-1.0        

 

 Automated eosinophil count            0.2 10*3/uL            0.0-0.3        

 

 Automated blood basophil count (count/volume)            0.0 10*3/uL          
  0.0-0.1        









 Complete urinalysis with reflex to culture - 17 09:07         









 Urine color determination            YELLOW             NRG        

 

 Urine clarity determination            CLEAR             NRG        

 

 Urine pH measurement by test strip            6             5-9        

 

 Specific gravity of urine by test strip            1.020             1.016-
1.022        

 

 Urine protein assay by test strip, semi-quantitative            NEGATIVE      
       NEGATIVE        

 

 Urine glucose detection by automated test strip            NEGATIVE           
  NEGATIVE        

 

 Erythrocytes detection in urine sediment by light microscopy            
NEGATIVE             NEGATIVE        

 

 Urine ketones detection by automated test strip            NEGATIVE           
  NEGATIVE        

 

 Urine nitrite detection by test strip            NEGATIVE             NEGATIVE
        

 

 Urine total bilirubin detection by test strip            NEGATIVE             
NEGATIVE        

 

 Urine urobilinogen measurement by automated test strip (mass/volume)          
  4 mg/dL            NORMAL        

 

 Urine leukocyte esterase detection by dipstick            NEGATIVE             
NEGATIVE        

 

 Automated urine sediment erythrocyte count by microscopy (number/high power 
field)            NONE             NRG        

 

 Automated urine sediment leukocyte count by microscopy (number/high power field
)            NONE             NRG        

 

 Bacteria detection in urine sediment by light microscopy            NEGATIVE  
           NRG        

 

 Crystals detection in urine sediment by light microscopy            NONE      
       NRG        

 

 Casts detection in urine sediment by light microscopy            NONE         
    NRG        

 

 Mucus detection in urine sediment by light microscopy            SMALL        
     NRG        

 

 Complete urinalysis with reflex to culture            NO             NRG      
  









 Urine drug screening test - 17 09:07         









 Urine phencyclidine detection by screening method            NEGATIVE         
    NEGATIVE        

 

 Urine benzodiazepines detection by screening method            POSITIVE       
      NEGATIVE        

 

 Urine cocaine detection            NEGATIVE             NEGATIVE        

 

 Urine amphetamines detection by screening method            POSITIVE          
   NEGATIVE        

 

 Urine methamphetamine detection by screening method            NEGATIVE       
      NEGATIVE        

 

 Urine cannabinoids detection by screening method            POSITIVE          
   NEGATIVE        

 

 Urine opiates detection by screening method            NEGATIVE             
NEGATIVE        

 

 Urine barbiturates detection            NEGATIVE             NEGATIVE        

 

 Screening urine tricyclic antidepressants detection            NEGATIVE       
      NEGATIVE        

 

 Urine methadone detection by screening method            NEGATIVE             
NEGATIVE        

 

 Urine oxycodone detection            NEGATIVE             NEGATIVE        

 

 Urine propoxyphene detection            NEGATIVE             NEGATIVE        









 Complete blood count (CBC) with automated white blood cell (WBC) differential 
- 17 14:35         









 Blood leukocytes automated count (number/volume)            9.1 10*3/uL       
     4.3-11.0        

 

 Blood erythrocytes automated count (number/volume)            5.04 10*6/uL    
        4.35-5.85        

 

 Venous blood hemoglobin measurement (mass/volume)            15.6 g/dL        
    13.3-17.7        

 

 Blood hematocrit (volume fraction)            46 %            40-54        

 

 Automated erythrocyte mean corpuscular volume            91 [foz_us]          
  80-99        

 

 Automated erythrocyte mean corpuscular hemoglobin (mass per erythrocyte)      
      31 pg            25-34        

 

 Automated erythrocyte mean corpuscular hemoglobin concentration measurement (
mass/volume)            34 g/dL            32-36        

 

 Automated erythrocyte distribution width ratio            13.8 %            
10.0-14.5        

 

 Automated blood platelet count (count/volume)            235 10*3/uL          
  130-400        

 

 Automated blood platelet mean volume measurement            10.5 [foz_us]     
       7.4-10.4        

 

 Automated blood neutrophils/100 leukocytes            61 %            42-75   
     

 

 Automated blood lymphocytes/100 leukocytes            24 %            12-44   
     

 

 Blood monocytes/100 leukocytes            10 %            0-12        

 

 Automated blood eosinophils/100 leukocytes            4 %            0-10     
   

 

 Automated blood basophils/100 leukocytes            1 %            0-10        

 

 Blood neutrophils automated count (number/volume)            5.6 10*3         
   1.8-7.8        

 

 Blood lymphocytes automated count (number/volume)            2.2 10*3         
   1.0-4.0        

 

 Blood monocytes automated count (number/volume)            0.9 10*3            
0.0-1.0        

 

 Automated eosinophil count            0.4 10*3/uL            0.0-0.3        

 

 Automated blood basophil count (count/volume)            0.1 10*3/uL          
  0.0-0.1        









 Comprehensive metabolic panel - 17 14:35         









 Serum or plasma sodium measurement (moles/volume)            139 mmol/L       
     135-145        

 

 Serum or plasma potassium measurement (moles/volume)            3.7 mmol/L    
        3.6-5.0        

 

 Serum or plasma chloride measurement (moles/volume)            107 mmol/L     
               

 

 Carbon dioxide            25 mmol/L            21-32        

 

 Serum or plasma anion gap determination (moles/volume)            7 mmol/L    
        5-14        

 

 Serum or plasma urea nitrogen measurement (mass/volume)            7 mg/dL    
        7-18        

 

 Serum or plasma creatinine measurement (mass/volume)            0.95 mg/dL    
        0.60-1.30        

 

 Serum or plasma urea nitrogen/creatinine mass ratio            7             0-
20        

 

 Serum or plasma creatinine measurement with calculation of estimated 
glomerular filtration rate            >             NRG        

 

 Serum or plasma glucose measurement (mass/volume)            119 mg/dL        
            

 

 Serum or plasma calcium measurement (mass/volume)            9.2 mg/dL        
    8.5-10.1        

 

 Serum or plasma total bilirubin measurement (mass/volume)            0.4 mg/dL
            0.1-1.0        

 

 Serum or plasma alkaline phosphatase measurement (enzymatic activity/volume)  
          129 U/L                    

 

 Serum or plasma aspartate aminotransferase measurement (enzymatic activity/
volume)            15 U/L            5-34        

 

 Serum or plasma alanine aminotransferase measurement (enzymatic activity/volume
)            10 U/L            0-55        

 

 Serum or plasma protein measurement (mass/volume)            7.2 g/dL         
   6.4-8.2        

 

 Serum or plasma albumin measurement (mass/volume)            3.9 g/dL         
   3.2-4.5        









 Serum or plasma troponin i.cardiac measurement (mass/volume) - 17 14:35 
        









 Serum or plasma troponin i.cardiac measurement (mass/volume)            < ng/
mL            <0.30        









 Serum or plasma ethanol measurement (mass/volume) - 17 14:35         









 Serum or plasma ethanol measurement (mass/volume)            < mg/dL          
  <10        



                                      



Encounters

      





 ACCT No.            Visit Date/Time            Discharge            Status    
        Pt. Type            Provider            Facility            Loc./Unit  
          Complaint        

 

 [id number]            2015 08:03:00            2015 23:59:59          
  CLS            Outpatient            NATALIA BAUTISTA D[facility]                    
                           

 

 [id number]            2014 14:12:00            2014 23:59:59          
  CLS            Outpatient            MARILU AGUILAR MD                     
                          

 

 [id number]            2014 09:45:00            2014 23:59:59          
  CLS            Outpatient            MARILU AGUILAR MD                     
                          

 

 [id number]            2014 10:08:00            2014 23:59:59          
  CLS            Outpatient            MARILU AGUILAR MD                     
                          

 

 [id number]            2013 10:56:00            2013 23:59:59          
  CLS            Outpatient            KIANA GALLEGOS                  
                             

 

 [id number]            10/25/2013 14:47:00            10/25/2013 23:59:59          
  CLS            Outpatient            MYKE PHILLIPS DO                        
                       

 

 [id number]            2013 10:56:00            2013 23:59:59          
  CLS            Outpatient            BALAJI STARR                   
                            

 

 [id number]            2013 08:47:00            2013 23:59:59          
  CLS            Outpatient            MYKE PHILLIPS DO                        
                       

 

 [id number]            2013 11:04:00                                      
Document Registration                                                          
  

 

 [id number]            2017 14:18:00            2017 16:45:00    
        DIS            Emergency            ALDEN BAIRES MD            [facility] 
[facility]            ER            BLOOD PANEL NEEDED POSS 
HEART ATTACK IN LAST WEEK        

 

 [id number]            2017 15:11:00            2017 11:47:00    
        DIS            Inpatient            TUSHAR MARLOW MD            [address] 
[address]            DRUG WITHDRAWL        

 

 [id number]            2017 15:01:00            2017 15:01:00    
        CAN            Preadmit            ALDEN BAIRES MD            [facility] 
[facility]            ER            MENTAL HEALTH        

 

 [id number]            2015 13:25:00            2015 11:25:00    
        DIS            Inpatient            MARILU AGUILAR MD            [facility] 
[facility]            4TH            COLITIS;INTRACTABLE 
NAUSEA AND VOMITING        

 

 [id number]            2015 12:05:00            2015 23:59:59    
        CLS            Outpatient            SURI ARGUETA            
[facility]            CARD            CP,CAD,HLP        

 

 [id number]            2014 01:32:00            2014 03:34:00    
        DIS            Emergency            STEVIE REAVES DO            [facility] 
[facility]            ER            AMS        

 

 [id number]            10/24/2013 11:47:00            10/24/2013 23:59:59    
        CLS            Outpatient                                              
              

 

 [id number]            10/01/2013 07:05:00            10/01/2013 14:34:00    
        DIS            Outpatient            PRISCA CRAWLEY FACC, TESSIE MINER CCDS     
       [facility]            CATH            CAD,SOB,
FATIGUE,ANGINA        

 

 [id number]            2013 10:26:00            2013 23:59:59    
        CLS            Outpatient            SURI ARGUETA            
[facility]            LAB            CAD,HTN,HLP,STATIN 
[place]        

 

 [id number]            2013 09:41:00                                   
   Document Registration                                                       
     

 

 [id number]            2013 12:45:00                                   
   Document Registration
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
## 2018-03-19 NOTE — XMS REPORT
Continuity of Care Document

 Created on: 2018



ROSE LALA

External Reference #: [id number]

: 1970

Sex: Male



Demographics







 Address  [address]  [address]

 

 Home Phone  [phone] x

 

 Preferred Language  Unknown

 

 Marital Status  Unknown

 

 [place] Affiliation  Unknown

 

 Race  Unknown

 

 Ethnic Group  Unknown





Author







 Author  [facility]

 

 Address  Unknown

 

 Phone  Unavailable



              



Allergies

      





 Active            Description            Code            Type            
Severity            Reaction            Onset            Reported/Identified   
         Relationship to Patient            Clinical Status        

 

 Yes            No Known Drug Allergies            [id number]            Drug 
Allergy            Unknown            N/A                         2013   
                               

 

 Yes            Zoloft 100 mg tablet                         Drug Allergy      
      N/A            N/A                         2014                    
              

 

 Yes            Wellbutrin 100 mg tablet                         Drug Allergy  
          N/A            N/A                         2014                
                  



                      



Medications

      



There is no data.                  



Problems

      





 Date Dx Coded            Attending            Type            Code            
Diagnosis            Diagnosed By        

 

 2013                         Ot            305.00            ALCOHOL 
ABUSE-UNSPEC                     

 

 2013                         Ot            305.1            TOBACCO USE 
DISORDER                     

 

 2013                         Ot            305.90            DRUG ABUSE 
NEC-UNSPEC                     

 

 2013                         Ot            414.01            CORONARY 
ATHEROSCLEROSIS OF NATIVE CORON                     

 

 2013                         Ot            786.50            CHEST PAIN 
NOS                     

 

 2013                         Ot            V17.49            FAMILY 
HISTORY OF OTHER CARDIOVASCULAR D                     

 

 2013            MYKE PHILLIPS DO                         300.00         
   ANXIETY UNSPEC                     

 

 2013            MYKE PHILLIPS DO                         780.99         
   ANHEDONIA                     

 

 2013            BALAJI STARR                         300.00    
        ANXIETY UNSPEC                     

 

 2013            BALAJI STARR                         780.99    
        ANHEDONIA                     

 

 2013                                      300.00            ANXIETY 
UNSPEC                     

 

 2013                                      780.99            ANHEDONIA   
                  

 

 2013            MYKE PHILLIPS DO                         300.00         
   ANXIETY UNSPEC                     

 

 2013            MYKE PHILLIPS DO                         780.99         
   ANHEDONIA                     

 

 2013            KIANA GALLEGOS                         300.00   
         ANXIETY UNSPEC                     

 

 2013            KIANA GALLEGOS                         780.99   
         ANHEDONIA                     

 

 2013            MARILU AGUILAR MD                         300.00      
      ANXIETY UNSPEC                     

 

 2013            MARILU AGUILAR MD                         780.99      
      ANHEDONIA                     

 

 2013            MARILU AGUILAR MD                         300.00      
      ANXIETY UNSPEC                     

 

 2013            MARILU AGUILAR MD                         780.99      
      ANHEDONIA                     

 

 2013            MARILU AGUILAR MD                         300.00      
      ANXIETY UNSPEC                     

 

 2013            MARILU AGUILAR MD                         780.99      
      ANHEDONIA                     

 

 2013            NATALIA BAUTISTA DDS                         300.00     
       ANXIETY UNSPEC                     

 

 2013            NATALIA BAUTISTA DDS                         780.99     
       ANHEDONIA                     

 

 2013            BALAJI STARR                         719.41    
        PAIN IN JOINT INVOLVING SHOULDER REGION                     

 

 2013                                      719.41            PAIN IN 
JOINT INVOLVING SHOULDER REGION                     

 

 2013            MYKE PHILLIPS DO                         719.41         
   PAIN IN JOINT INVOLVING SHOULDER REGION                     

 

 2013            KIANA GALLEGOS                         719.41   
         PAIN IN JOINT INVOLVING SHOULDER REGION                     

 

 2013            MARILU AGUILAR MD                         719.41      
      PAIN IN JOINT INVOLVING SHOULDER REGION                     

 

 2013            MARILU AGUILAR MD                         719.41      
      PAIN IN JOINT INVOLVING SHOULDER REGION                     

 

 2013            MARILU AGUILAR MD                         719.41      
      PAIN IN JOINT INVOLVING SHOULDER REGION                     

 

 2013            NATALIA BAUTISTA DDS                         719.41     
       PAIN IN JOINT INVOLVING SHOULDER REGION                     

 

 2013            MYKE PHILLIPS DO                         786.50         
   CHEST PAIN                     

 

 2013            KIANA GALLEGOS S                         786.50   
         CHEST PAIN                     

 

 2013            MARILU AGUILAR MD                         786.50      
      CHEST PAIN                     

 

 2013            MARILU AGUILAR MD                         786.50      
      CHEST PAIN                     

 

 2013            MARILU AGUILAR MD                         786.50      
      CHEST PAIN                     

 

 2013            NATALIA BAUTISTA DDS                         786.50     
       CHEST PAIN                     

 

 10/01/2013            TESSIE COPE MD, FACC FACP CCDS            Ot            
272.4            HYPERLIPIDEMIA NEC/NOS                     

 

 10/01/2013            TESSIE COEP MD, FACC FACP CCDS            Ot            
305.00            ALCOHOL ABUSE-UNSPEC                     

 

 10/01/2013            TESSIE COPE MD, FACC FACP CCDS            Ot            
305.1            TOBACCO USE DISORDER                     

 

 10/01/2013            TESSIE COPE MD, FACC FACP CCDS            Ot            
414.01            CORONARY ATHEROSCLEROSIS OF NATIVE CORON                     

 

 10/01/2013            TESSIE COPE MD, FACC FACP CCDS            Ot            
414.4            CORONARY ATHEROSCLEROSIS DUE TO CALCIFIE                     

 

 10/01/2013            TESSIE COPE MD, FACC FACP CCDS            Ot            
786.59            CHEST PAIN NEC                     

 

 10/01/2013            TESSIE COPE MD, FACC FACP CCDS            Ot            
V15.81            HX OF PAST NONCOMPLIANCE                     

 

 10/01/2013            PRISCA CRAWLEY F[facility]            Ot            
V45.82            PERCUTANEOUS TRANSLUM CORON ANGIOPLASTY                      

 

 10/01/2013            PRISCA CRAWLEY FACC, [facility]ESSIE [facility]            Ot            
V58.63            LONG-TERM(CURRENT)USE OF ANTIPLATELET/AN                     

 

 10/01/2013            PRISCA CRAWLEY FACC, [facility]ESSIE [facility]            Ot            
V58.69            OTH MED,LT,CURRENT USE                     

 

 10/25/2013            PHILLIPS DO MYKE K                         272.4          
  HYPERLIPIDEMIA                     

 

 10/25/2013            PHILLIPS DO MYKE K                         401.1          
  HYPERTENSION, BENIGN ESSENTIAL                     

 

 10/25/2013            PHILLIPS DO, MYKE K                         414.00         
   CORONARY ATHEROSCLEROSIS OF UNSPECIFIED TYPE OF VESSEL NATIVE OR GRAFT      
               

 

 10/25/2013            KIANA GALLEGOS S                         272.4    
        HYPERLIPIDEMIA                     

 

 10/25/2013            KIANA GALLEGOS S                         401.1    
        HYPERTENSION, BENIGN ESSENTIAL                     

 

 10/25/2013            FERMIN GALLEGOSA S                         414.00   
         CORONARY ATHEROSCLEROSIS OF UNSPECIFIED TYPE OF VESSEL NATIVE OR GRAFT
                     

 

 10/25/2013            MARILU AGUILAR MD N                         272.4       
     HYPERLIPIDEMIA                     

 

 10/25/2013            MARILU AGUILAR MD N                         401.1       
     HYPERTENSION, BENIGN ESSENTIAL                     

 

 10/25/2013            MARILU AGUILAR MD N                         414.00      
      CORONARY ATHEROSCLEROSIS OF UNSPECIFIED TYPE OF VESSEL NATIVE OR GRAFT   
                  

 

 10/25/2013            MARILU AGUILAR MD N                         272.4       
     HYPERLIPIDEMIA                     

 

 10/25/2013            MARILU AGUILAR MD N                         401.1       
     HYPERTENSION, BENIGN ESSENTIAL                     

 

 10/25/2013            MARILU AGUILAR MD N                         414.00      
      CORONARY ATHEROSCLEROSIS OF UNSPECIFIED TYPE OF VESSEL NATIVE OR GRAFT   
                  

 

 10/25/2013            MARILU AGUILAR MD N                         272.4       
     HYPERLIPIDEMIA                     

 

 10/25/2013            MARILU AGUILAR MD N                         401.1       
     HYPERTENSION, BENIGN ESSENTIAL                     

 

 10/25/2013            MARILU AGUILAR MD N                         414.00      
      CORONARY ATHEROSCLEROSIS OF UNSPECIFIED TYPE OF VESSEL NATIVE OR GRAFT   
                  

 

 10/25/2013            WHITE DDS, NATALIA J                         272.4      
      HYPERLIPIDEMIA                     

 

 10/25/2013            WHITE DDS, NATALIA J                         401.1      
      HYPERTENSION, BENIGN ESSENTIAL                     

 

 10/25/2013            WHITE DDS, NATALIA J                         414.00     
       CORONARY ATHEROSCLEROSIS OF UNSPECIFIED TYPE OF VESSEL NATIVE OR GRAFT  
                   

 

 2013            BRUNILDA APRN, KIANA S                         525.9    
        UNSPECIFIED DISORDER OF THE TEETH AND SUPPORTING STRUCTURES            
         

 

 2013            MARILU AGUILAR MD N                         525.9       
     UNSPECIFIED DISORDER OF THE TEETH AND SUPPORTING STRUCTURES               
      

 

 2013            MARILU AGUILAR MD N                         525.9       
     UNSPECIFIED DISORDER OF THE TEETH AND SUPPORTING STRUCTURES               
      

 

 2013            MARILU AGUILAR MD N                         525.9       
     UNSPECIFIED DISORDER OF THE TEETH AND SUPPORTING STRUCTURES               
      

 

 2013            NATALIA BAUTISTA DDS                         525.9      
      UNSPECIFIED DISORDER OF THE TEETH AND SUPPORTING STRUCTURES              
       

 

 2014            MARILU AGUILAR MD N                         303.90      
      OTHER AND UNSPECIFIED ALCOHOL DEPENDENCE UNSPECIFIED DRINKING BEHAVIOR   
                  

 

 2014            MARILU AGUILAR MD N                         578.1       
     BLOOD IN STOOL                     

 

 2014            MARILU AGUILAR MD N                         786.30      
      HEMOPTYSIS UNSPECIFIED                     

 

 2014            MARILU AGUILAR MD N                         303.90      
      OTHER AND UNSPECIFIED ALCOHOL DEPENDENCE UNSPECIFIED DRINKING BEHAVIOR   
                  

 

 2014            MARILU AGUILAR MD N                         578.1       
     BLOOD IN STOOL                     

 

 2014            MARILU AGUILAR MD N                         786.30      
      HEMOPTYSIS UNSPECIFIED                     

 

 2014            MARILU AGUILAR MD N                         303.90      
      OTHER AND UNSPECIFIED ALCOHOL DEPENDENCE UNSPECIFIED DRINKING BEHAVIOR   
                  

 

 2014            MARILU AGUILAR MD N                         578.1       
     BLOOD IN STOOL                     

 

 2014            MARILU AGUILAR MD N                         786.30      
      HEMOPTYSIS UNSPECIFIED                     

 

 2014            NATALIA BAUTISTA DDS                         303.90     
       OTHER AND UNSPECIFIED ALCOHOL DEPENDENCE UNSPECIFIED DRINKING BEHAVIOR  
                   

 

 2014            NATALIA BAUTISTA DDS                         578.1      
      BLOOD IN STOOL                     

 

 2014            NATALIA BAUTISTA DDS                         786.30     
       HEMOPTYSIS UNSPECIFIED                     

 

 2014            STEVIE REAVES DO            Ot            305.00         
   ALCOHOL ABUSE-UNSPEC                     

 

 2014            STEVIE REAVES DO            Ot            305.90         
   DRUG ABUSE NEC-UNSPEC                     

 

 2014            STEVIE REAVES DO            Ot            780.97         
   ALTERED MENTAL STATUS                     

 

 2014            MARILU AGUILAR MD N                         356.9       
     UNSPECIFIED IDIOPATHIC PERIPHERAL NEUROPATHY                     

 

 2014            NATALIA BAUTISTA DDS J                         356.9      
      UNSPECIFIED IDIOPATHIC PERIPHERAL NEUROPATHY                     

 

 2015            NATALIA BAUTISTA DDS                         786.50     
       CHEST PAIN                     

 

 2015                         Ot            414.00                       
           

 

 2015            BAIMA, SURI L ARNP            Ot            272.4    
                              

 

 2015            BAIMA, SURI L ARNP            Ot            401.9    
                              

 

 2015            BAIMA, SURI L ARNP            Ot            414.00   
                               

 

 2015            BAIMA, SURI L ARNP            Ot            V58.69   
                               

 

 2015            WHITE DDS, NATALIA J                         V01.6      
      EXPOSURE TO VENEREAL DISEASES                     

 

 2015            MARILU AGUILAR MD            Ot            008.8      
      VIRAL ENTERITIS NOS                     

 

 2015            MARILU AGUILAR MD            Ot            272.0      
      PURE HYPERCHOLESTEROLEM                     

 

 2015            MARILU AGUILAR MD            Ot            305.20     
       CANNABIS ABUSE-UNSPEC                     

 

 2015            MARILU AGUILAR MD            Ot            401.9      
      HYPERTENSION NOS                     

 

 2015            MARILU AGUILAR MD            Ot            414.01     
       CORONARY ATHEROSCLEROSIS OF NATIVE CORON                     

 

 2015            MARILU AGUILAR MD            Ot            V11.3      
      HX OF ALCOHOLISM                     

 

 2015            MARILU AGUILAR MD            Ot            V13.01     
       PERSONAL HISTORY OF URINARY CALCULI                     

 

 2015            MARILU AGUILAR MD            Ot            V45.82     
       PERCUTANEOUS TRANSLUM CORON ANGIOPLASTY                      

 

 2015                         Ot            414.00                       
           

 

 2015            BAIMA, SURI L ARNP            Ot            272.4    
                              

 

 2015            BAIMA, SURI L ARNP            Ot            401.9    
                              

 

 2015            BAIMA, SURI L ARNP            Ot            414.00   
                               

 

 2015            BAIMA, SURI L ARNP            Ot            V58.69   
                               

 

 2015            BAIMA, SURI L ARNP            Ot            272.4    
                              

 

 2015            BAIMA, SURI L ARNP            Ot            414.00   
                               

 

 2015            BAIMA, SURI L ARNP            Ot            786.50   
                               

 

 2017                         Ot            414.00            CORON 
ATHEROSCLER NOS TYPE VESSEL, NATIV                     

 

 2017            BAIMA, SURI L ARNP            Ot            272.4    
        HYPERLIPIDEMIA NEC/NOS                     

 

 2017            BAIMA, SURI L ARNP            Ot            401.9    
        HYPERTENSION NOS                     

 

 2017            BAIMA, SURI L ARNP            Ot            414.00   
         CORON ATHEROSCLER NOS TYPE VESSEL, NATIV                     

 

 2017            BAIMA, SURI L ARNP            Ot            V58.69   
         OTH MED,LT,CURRENT USE                     

 

 2017            BAIMA, SURI L ARNP            Ot            272.4    
        HYPERLIPIDEMIA NEC/NOS                     

 

 2017            BAIMA, SURI L ARNP            Ot            414.00   
         CORON ATHEROSCLER NOS TYPE VESSEL, NATIV                     

 

 2017            BAIMA, SURI L ARNP            Ot            786.50   
         CHEST PAIN NOS                     

 

 2017            KASHMIR CRAWLEY, TUSHAR DIAMOND            Ot            F11.151      
      OPIOID ABUSE W OPIOID-INDUCED PSYCHOTIC                      

 

 2017            TUSHAR MARLOW MD            Ot            F17.210      
      NICOTINE DEPENDENCE, CIGARETTES, UNCOMPL                     

 

 2017                         Ot            414.00            CORON 
ATHEROSCLER NOS TYPE VESSEL, NATIV                     

 

 2017            BAIMA, SURI L ARNP            Ot            272.4    
        HYPERLIPIDEMIA NEC/NOS                     

 

 2017            BAIMA, SURI L ARNP            Ot            401.9    
        HYPERTENSION NOS                     

 

 2017            BAIMA, SURI L ARNP            Ot            414.00   
         CORON ATHEROSCLER NOS TYPE VESSEL, NATIV                     

 

 2017            BAIMA, SURI L ARNP            Ot            V58.69   
         OTH MED,LT,CURRENT USE                     

 

 2017            BAIMA, SURI L ARNP            Ot            272.4    
        HYPERLIPIDEMIA NEC/NOS                     

 

 2017            BAIMA, SURI L ARNP            Ot            414.00   
         CORON ATHEROSCLER NOS TYPE VESSEL, NATIV                     

 

 2017            BAIMA, SURI L ARNP            Ot            786.50   
         CHEST PAIN NOS                     

 

 2017                         Ot            414.00            CORON 
ATHEROSCLER NOS TYPE VESSEL, NATIV                     

 

 2017            BAIMA, SURI L ARNP            Ot            272.4    
        HYPERLIPIDEMIA NEC/NOS                     

 

 2017            BAIMA, SURI L ARNP            Ot            401.9    
        HYPERTENSION NOS                     

 

 2017            BAIMA, SURI L ARNP            Ot            414.00   
         CORON ATHEROSCLER NOS TYPE VESSEL, NATIV                     

 

 2017            BAIMA, SURI L ARNP            Ot            V58.69   
         OTH MED,LT,CURRENT USE                     

 

 2017            BAIMA, SURI L ARNP            Ot            272.4    
        HYPERLIPIDEMIA NEC/NOS                     

 

 2017            BAIMA, SURI L ARNP            Ot            414.00   
         CORON ATHEROSCLER NOS TYPE VESSEL, NATIV                     

 

 2017            BAIMA, SURI L ARNP            Ot            786.50   
         CHEST PAIN NOS                     

 

 2017                         Ot            414.00            CORON 
ATHEROSCLER NOS TYPE VESSEL, NATIV                     

 

 2017            BAIMA, SURI L ARNP            Ot            272.4    
        HYPERLIPIDEMIA NEC/NOS                     

 

 2017            BAIMA, SURI L ARNP            Ot            401.9    
        HYPERTENSION NOS                     

 

 2017            BAIMA, SURI L ARNP            Ot            414.00   
         CORON ATHEROSCLER NOS TYPE VESSEL, NATIV                     

 

 2017            BAIMA, SURI L ARNP            Ot            V58.69   
         OTH MED,LT,CURRENT USE                     

 

 2017            BAIMA, SURI L ARNP            Ot            272.4    
        HYPERLIPIDEMIA NEC/NOS                     

 

 2017            BAIMA, SURI L ARNP            Ot            414.00   
         CORON ATHEROSCLER NOS TYPE VESSEL, NATIV                     

 

 2017            BAIMA, SURI L ARNP            Ot            786.50   
         CHEST PAIN NOS                     

 

 2017                         Ot            414.00            CORON 
ATHEROSCLER NOS TYPE VESSEL, NATIV                     

 

 2017            BAIMA, SURI L ARNP            Ot            272.4    
        HYPERLIPIDEMIA NEC/NOS                     

 

 2017            BAIMA, SURI L ARNP            Ot            401.9    
        HYPERTENSION NOS                     

 

 2017            BAIMA, SURI L ARNP            Ot            414.00   
         CORON ATHEROSCLER NOS TYPE VESSEL, NATIV                     

 

 2017            BAIMA, SURI L ARNP            Ot            V58.69   
         OTH MED,LT,CURRENT USE                     

 

 2017            BAIMA, SURI L ARNP            Ot            272.4    
        HYPERLIPIDEMIA NEC/NOS                     

 

 2017            BAIMA, SURI L ARNP            Ot            414.00   
         CORON ATHEROSCLER NOS TYPE VESSEL, NATIV                     

 

 2017            BAIMA, SURI L ARNP            Ot            786.50   
         CHEST PAIN NOS                     



                                                                               
                                                                               
                                                                               
                                                                               
      



Procedures

      





 Code            Description            Performed By            Performed On   
     

 

             Cardiolog                                  Eugene Lang            
                       2013        

 

             45693                                  THERAPUTIC INJ SQ/IM       
                            2013        

 

                                               ROCEPHIN INJ 1 g           
                        2013        

 

             77538                                  THERAPUTIC INJ SQ/IM       
                            2013        

 

                                               ROCEPHIN INJ 1 g           
                        2013        

 

             GENERAL S                                  ANTHONY ZAPATA          
                         2014        

 

             80068                                  XRAY CERVICAL SPINE, 2 OR 3 
VIEWS                                   2014        

 

             28676                                  XRAY LUMBAR SPINE 2 OR 3 
VIEWS                                   2014        

 

             41975                                  CMP                        
           2014        

 

             03570                                  VIT B 12                   
                2014        

 

             93207                                  FOLATE                     
              2014        

 

             57618                                  NUCLEAR STRESS TESTING     
                              2015        

 

             77527                                  OXIMETRY                   
                2015        



                                          



Results

      





 Test            Result            Range        









 PT panel in platelet poor plasma by coagulation assay - 17 16:36         









 Prothrombin time (PT) in platelet poor plasma by coagulation assay            
13.3 s            12.2-14.7        

 

 INR in platelet poor plasma or blood by coagulation assay            1.0      
       0.8-1.4        









 Comprehensive metabolic panel - 17 16:36         









 Serum or plasma sodium measurement (moles/volume)            136 mmol/L       
     135-145        

 

 Serum or plasma potassium measurement (moles/volume)            3.6 mmol/L    
        3.6-5.0        

 

 Serum or plasma chloride measurement (moles/volume)            105 mmol/L     
               

 

 Carbon dioxide            20 mmol/L            21-32        

 

 Serum or plasma anion gap determination (moles/volume)            11 mmol/L   
         5-14        

 

 Serum or plasma urea nitrogen measurement (mass/volume)            12 mg/dL   
         7-18        

 

 Serum or plasma creatinine measurement (mass/volume)            0.99 mg/dL    
        0.60-1.30        

 

 Serum or plasma urea nitrogen/creatinine mass ratio            12             
NRG        

 

 Serum or plasma creatinine measurement with calculation of estimated 
glomerular filtration rate            >             NRG        

 

 Serum or plasma glucose measurement (mass/volume)            89 mg/dL         
           

 

 Serum or plasma calcium measurement (mass/volume)            9.3 mg/dL        
    8.5-10.1        

 

 Serum or plasma total bilirubin measurement (mass/volume)            1.2 mg/dL
            0.1-1.0        

 

 Serum or plasma alkaline phosphatase measurement (enzymatic activity/volume)  
          117 U/L                    

 

 Serum or plasma aspartate aminotransferase measurement (enzymatic activity/
volume)            19 U/L            5-34        

 

 Serum or plasma alanine aminotransferase measurement (enzymatic activity/volume
)            21 U/L            0-55        

 

 Serum or plasma protein measurement (mass/volume)            7.2 g/dL         
   6.4-8.2        

 

 Serum or plasma albumin measurement (mass/volume)            4.2 g/dL         
   3.2-4.5        









 Serum or plasma salicylates measurement (mass/volume) - 17 16:36         









 Serum or plasma salicylates measurement (mass/volume)            < mg/dL      
      5.0-20.0        









 Serum or plasma acetaminophen measurement (mass/volume) - 17 16:36      
   









 Serum or plasma acetaminophen measurement (mass/volume)            < ug/mL    
        10-30        









 Serum or plasma ethanol measurement (mass/volume) - 17 16:36         









 Serum or plasma ethanol measurement (mass/volume)            < mg/dL          
  <10        









 Complete blood count (CBC) with automated white blood cell (WBC) differential 
- 17 16:36         









 Blood leukocytes automated count (number/volume)            9.4 10*3/uL       
     4.3-11.0        

 

 Blood erythrocytes automated count (number/volume)            4.90 10*6/uL    
        4.35-5.85        

 

 Venous blood hemoglobin measurement (mass/volume)            15.1 g/dL        
    13.3-17.7        

 

 Blood hematocrit (volume fraction)            43 %            40-54        

 

 Automated erythrocyte mean corpuscular volume            87 [foz_us]          
  80-99        

 

 Automated erythrocyte mean corpuscular hemoglobin (mass per erythrocyte)      
      31 pg            25-34        

 

 Automated erythrocyte mean corpuscular hemoglobin concentration measurement (
mass/volume)            35 g/dL            32-36        

 

 Automated erythrocyte distribution width ratio            12.7 %            
10.0-14.5        

 

 Automated blood platelet count (count/volume)            276 10*3/uL          
  130-400        

 

 Automated blood platelet mean volume measurement            10.2 [foz_us]     
       7.4-10.4        

 

 Automated blood neutrophils/100 leukocytes            69 %            42-75   
     

 

 Automated blood lymphocytes/100 leukocytes            19 %            12-44   
     

 

 Blood monocytes/100 leukocytes            9 %            0-12        

 

 Automated blood eosinophils/100 leukocytes            2 %            0-10     
   

 

 Automated blood basophils/100 leukocytes            0 %            0-10        

 

 Blood neutrophils automated count (number/volume)            6.5 10*3         
   1.8-7.8        

 

 Blood lymphocytes automated count (number/volume)            1.8 10*3         
   1.0-4.0        

 

 Blood monocytes automated count (number/volume)            0.9 10*3            
0.0-1.0        

 

 Automated eosinophil count            0.2 10*3/uL            0.0-0.3        

 

 Automated blood basophil count (count/volume)            0.0 10*3/uL          
  0.0-0.1        









 Complete urinalysis with reflex to culture - 17 09:07         









 Urine color determination            YELLOW             NRG        

 

 Urine clarity determination            CLEAR             NRG        

 

 Urine pH measurement by test strip            6             5-9        

 

 Specific gravity of urine by test strip            1.020             1.016-
1.022        

 

 Urine protein assay by test strip, semi-quantitative            NEGATIVE      
       NEGATIVE        

 

 Urine glucose detection by automated test strip            NEGATIVE           
  NEGATIVE        

 

 Erythrocytes detection in urine sediment by light microscopy            
NEGATIVE             NEGATIVE        

 

 Urine ketones detection by automated test strip            NEGATIVE           
  NEGATIVE        

 

 Urine nitrite detection by test strip            NEGATIVE             NEGATIVE
        

 

 Urine total bilirubin detection by test strip            NEGATIVE             
NEGATIVE        

 

 Urine urobilinogen measurement by automated test strip (mass/volume)          
  4 mg/dL            NORMAL        

 

 Urine leukocyte esterase detection by dipstick            NEGATIVE             
NEGATIVE        

 

 Automated urine sediment erythrocyte count by microscopy (number/high power 
field)            NONE             NRG        

 

 Automated urine sediment leukocyte count by microscopy (number/high power field
)            NONE             NRG        

 

 Bacteria detection in urine sediment by light microscopy            NEGATIVE  
           NRG        

 

 Crystals detection in urine sediment by light microscopy            NONE      
       NRG        

 

 Casts detection in urine sediment by light microscopy            NONE         
    NRG        

 

 Mucus detection in urine sediment by light microscopy            SMALL        
     NRG        

 

 Complete urinalysis with reflex to culture            NO             NRG      
  









 Urine drug screening test - 17 09:07         









 Urine phencyclidine detection by screening method            NEGATIVE         
    NEGATIVE        

 

 Urine benzodiazepines detection by screening method            POSITIVE       
      NEGATIVE        

 

 Urine cocaine detection            NEGATIVE             NEGATIVE        

 

 Urine amphetamines detection by screening method            POSITIVE          
   NEGATIVE        

 

 Urine methamphetamine detection by screening method            NEGATIVE       
      NEGATIVE        

 

 Urine cannabinoids detection by screening method            POSITIVE          
   NEGATIVE        

 

 Urine opiates detection by screening method            NEGATIVE             
NEGATIVE        

 

 Urine barbiturates detection            NEGATIVE             NEGATIVE        

 

 Screening urine tricyclic antidepressants detection            NEGATIVE       
      NEGATIVE        

 

 Urine methadone detection by screening method            NEGATIVE             
NEGATIVE        

 

 Urine oxycodone detection            NEGATIVE             NEGATIVE        

 

 Urine propoxyphene detection            NEGATIVE             NEGATIVE        









 Complete blood count (CBC) with automated white blood cell (WBC) differential 
- 17 14:35         









 Blood leukocytes automated count (number/volume)            9.1 10*3/uL       
     4.3-11.0        

 

 Blood erythrocytes automated count (number/volume)            5.04 10*6/uL    
        4.35-5.85        

 

 Venous blood hemoglobin measurement (mass/volume)            15.6 g/dL        
    13.3-17.7        

 

 Blood hematocrit (volume fraction)            46 %            40-54        

 

 Automated erythrocyte mean corpuscular volume            91 [foz_us]          
  80-99        

 

 Automated erythrocyte mean corpuscular hemoglobin (mass per erythrocyte)      
      31 pg            25-34        

 

 Automated erythrocyte mean corpuscular hemoglobin concentration measurement (
mass/volume)            34 g/dL            32-36        

 

 Automated erythrocyte distribution width ratio            13.8 %            
10.0-14.5        

 

 Automated blood platelet count (count/volume)            235 10*3/uL          
  130-400        

 

 Automated blood platelet mean volume measurement            10.5 [foz_us]     
       7.4-10.4        

 

 Automated blood neutrophils/100 leukocytes            61 %            42-75   
     

 

 Automated blood lymphocytes/100 leukocytes            24 %            12-44   
     

 

 Blood monocytes/100 leukocytes            10 %            0-12        

 

 Automated blood eosinophils/100 leukocytes            4 %            0-10     
   

 

 Automated blood basophils/100 leukocytes            1 %            0-10        

 

 Blood neutrophils automated count (number/volume)            5.6 10*3         
   1.8-7.8        

 

 Blood lymphocytes automated count (number/volume)            2.2 10*3         
   1.0-4.0        

 

 Blood monocytes automated count (number/volume)            0.9 10*3            
0.0-1.0        

 

 Automated eosinophil count            0.4 10*3/uL            0.0-0.3        

 

 Automated blood basophil count (count/volume)            0.1 10*3/uL          
  0.0-0.1        









 Comprehensive metabolic panel - 17 14:35         









 Serum or plasma sodium measurement (moles/volume)            139 mmol/L       
     135-145        

 

 Serum or plasma potassium measurement (moles/volume)            3.7 mmol/L    
        3.6-5.0        

 

 Serum or plasma chloride measurement (moles/volume)            107 mmol/L     
               

 

 Carbon dioxide            25 mmol/L            21-32        

 

 Serum or plasma anion gap determination (moles/volume)            7 mmol/L    
        5-14        

 

 Serum or plasma urea nitrogen measurement (mass/volume)            7 mg/dL    
        7-18        

 

 Serum or plasma creatinine measurement (mass/volume)            0.95 mg/dL    
        0.60-1.30        

 

 Serum or plasma urea nitrogen/creatinine mass ratio            7             0-
20        

 

 Serum or plasma creatinine measurement with calculation of estimated 
glomerular filtration rate            >             NRG        

 

 Serum or plasma glucose measurement (mass/volume)            119 mg/dL        
            

 

 Serum or plasma calcium measurement (mass/volume)            9.2 mg/dL        
    8.5-10.1        

 

 Serum or plasma total bilirubin measurement (mass/volume)            0.4 mg/dL
            0.1-1.0        

 

 Serum or plasma alkaline phosphatase measurement (enzymatic activity/volume)  
          129 U/L                    

 

 Serum or plasma aspartate aminotransferase measurement (enzymatic activity/
volume)            15 U/L            5-34        

 

 Serum or plasma alanine aminotransferase measurement (enzymatic activity/volume
)            10 U/L            0-55        

 

 Serum or plasma protein measurement (mass/volume)            7.2 g/dL         
   6.4-8.2        

 

 Serum or plasma albumin measurement (mass/volume)            3.9 g/dL         
   3.2-4.5        









 Serum or plasma troponin i.cardiac measurement (mass/volume) - 17 14:35 
        









 Serum or plasma troponin i.cardiac measurement (mass/volume)            < ng/
mL            <0.30        









 Serum or plasma ethanol measurement (mass/volume) - 17 14:35         









 Serum or plasma ethanol measurement (mass/volume)            < mg/dL          
  <10        



                                      



Encounters

      





 ACCT No.            Visit Date/Time            Discharge            Status    
        Pt. Type            Provider            Facility            Loc./Unit  
          Complaint        

 

 [id number]            2015 08:03:00            2015 23:59:59          
  CLS            Outpatient            NATALIA BAUTISTA [facility]                    
                           

 

 [id number]            2014 14:12:00            2014 23:59:59          
  CLS            Outpatient            MARILU AGUILAR MD                     
                          

 

 [id number]            2014 09:45:00            2014 23:59:59          
  CLS            Outpatient            MARILU AGUILAR MD                     
                          

 

 [id number]            2014 10:08:00            2014 23:59:59          
  CLS            Outpatient            MARILU AGUILAR MD                     
                          

 

 [id number]            2013 10:56:00            2013 23:59:59          
  CLS            Outpatient            KIANA GALLEGOS                  
                             

 

 [id number]            10/25/2013 14:47:00            10/25/2013 23:59:59          
  CLS            Outpatient            MYKE PHILLIPS DO                        
                       

 

 [id number]            2013 10:56:00            2013 23:59:59          
  CLS            Outpatient            BALAJI STARR                   
                            

 

 [id number]            2013 08:47:00            2013 23:59:59          
  CLS            Outpatient            MYKE PHILLIPS DO                        
                       

 

 [id number]            2013 11:04:00                                      
Document Registration                                                          
  

 

 [id number]            2017 14:18:00            2017 16:45:00    
        DIS            Emergency            ALDEN BAIRES MD            [facility] 
[facility]            ER            BLOOD PANEL NEEDED POSS 
HEART ATTACK IN LAST WEEK        

 

 [id number]            2017 15:11:00            2017 11:47:00    
        DIS            Inpatient            TUSHAR MARLOW MD            [address] 
[address]            DRUG WITHDRAWL        

 

 [id number]            2017 15:01:00            2017 15:01:00    
        CAN            Preadmit            ALDEN BAIRES MD            [facility] 
[facility]            ER            MENTAL HEALTH        

 

 [id number]            2015 13:25:00            2015 11:25:00    
        DIS            Inpatient            MARILU AGUILAR MD            [facility] 
[facility]            4TH            COLITIS;INTRACTABLE 
NAUSEA AND VOMITING        

 

 [id number]            2015 12:05:00            2015 23:59:59    
        CLS            Outpatient            SURI ARGUETA            
[facility]            CARD            CP,CAD,HLP        

 

 [id number]            2014 01:32:00            2014 03:34:00    
        DIS            Emergency            STEVIE REAVES DO            [facility] 
[facility]            ER            AMS        

 

 [id number]            10/24/2013 11:47:00            10/24/2013 23:59:59    
        CLS            Outpatient                                              
              

 

 [id number]            10/01/2013 07:05:00            10/01/2013 14:34:00    
        DIS            Outpatient            PRISCA CRAWLEY FACC, TESSIE MINER CCDS     
       [facility]            CATH            CAD,SOB,
FATIGUE,ANGINA        

 

 [id number]            2013 10:26:00            2013 23:59:59    
        CLS            Outpatient            SURI ARGUETA            
[facility]            LAB            CAD,HTN,HLP,STATIN 
[place]        

 

 [id number]            2013 09:41:00                                   
   Document Registration                                                       
     

 

 [id number]            2013 12:45:00                                   
   Document Registration

## 2018-03-19 NOTE — CORONARY ANGIOGRAPHY & PCI
Coronary Angiography & PCI


DATE OF PROCEDURE: 3/19/18 





INDICATION: Acute inferior STEMI, cardiac arrest





PREOPERATIVE DIAGNOSIS: Acute inferior STEMI, cardiac arrest





POSTOPERATIVE DIAGNOSIS: Successful primary PCI of OM1.





HISTORY: 48-year-old gentleman with previous history of PCI to OM1 artery in 

Jan 2013 with repeat coronary angiography in October 2013 which did not reveal 

any significant CAD.  Patient is non-compliant with medications.  Recurrent 

chest pain for the last 3 weeks however severe chest pain episodes in the last 

48 hours.  This morning severe chest pain started at 7 a.m. patient presented 

to the ER and had in-hospital cardiac arrest with ventricular fibrillation 

which was promptly defibrillated with successful return of circulation.  EKG 

showed inferior STEMI.  Therefore, the patient was scheduled for emergent 

coronary angiography. 





PROCEDURES PERFORMED: 


1.Coronary angiography. 


2.Left heart catheterization. 


3.PCI to the OM 1.


4.  Aortic root injection.


5.  Aortic arch angiography.





COMPLICATIONS: None. 


SPECIMENS: None. 


ESTIMATED BLOOD LOSS: 10 mL


ANESTHESIA: Conscious sedation


ANTICOAGULATION: IV heparin, IV Integrilin.


CONTRAST: 175 mL.


FLUOROSCOPY: 12.8 minutes.


FLOUROSCOPY DOSE: 1025 mgy.





PROCEDURE DETAILS: The patient is a 48 male and was brought to the cath lab 

after informed consent was taken. All the risks and complications were 

explained in detail; this included the risk of bleeding, vascular damage, stroke

, MI and even death. The patient was draped and prepped in the usual sterile 

fashion.  Access was gained in the right femoral artery with a 6 Albanian sheath.

  We used the following catheters 6 Albanian JR4 guide catheter, JR4 diagnostic 

catheter, AL1 catheter, MP catheter, JL4 diagnostic catheter, EBU 4 guide 

catheter. C, Primary PCI to OM, Aortic root injection, Aortic arch angiography

, coronary angiography.  Nonselective angiogram of the RCA due to difficult 

engagement.





FINDINGS: 


1.Left main: patent.


2.LAD: Mild disease. Transapical artery.


3.Left circumflex artery: Occluded OM1, likely within a stent (which is not 

very easily visualized).


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 Non-dominant, Pulsating 

mid segment - spasm vs. stenotic lesion vs. external pressure.


5.Left heart catheterization: Aortic pressure 180/118 mmHg, LV pressure 177/14 

mmHg.  LVEDP 34 mmHg.  No gradient across the aortic valve.  Preserved LV 

function with no wall motion abnormality on ESPINOZA view only.


6.  Aortic root injection: No significant left main disease and anomalous 

origin of RCA noted.  No significant aortic regurgitation.


7.  Aortic arch angiography: No aneurysm or dissection noted.  Normal great 

vessels including brachiocephalic artery, left carotid artery, left subclavian 

artery.





RECOMMENDATIONS:


Primary PCI to OM1 recommended.





INTERVENTION DETAILS:


Since EKG showed inferior ST elevation we started off with a JR4 guide catheter

, however we quickly realized that RCA was not the culprit vessel.  We then 

used a diagnostic JL4 to engage left coronary system and observed total 

occlusion of OM1 artery.  We then quickly changed to an EBU guide catheter.  

The lesion was crossed with a whisper wire.  The patient was given Plavix 300 

mg bolus in the ER and we repeated another 300 mg in the Cath lab.  IV heparin 

was given and ACT was 280 seconds.  Significant thrombus burden was also noted 

therefore Integrilin was also given.  The lesion was treated initially with a 

2.0 balloon and flow was reestablished.  Door to balloon time was 39 minutes.  

The lesion was treated with the Xience Alpine 2.75X 23 stent.  This was 

inflated to 16 sharon. Postdilatation with a 3.0 x 15mm NC Quantum stent which was 

dilated twice at 12 and 16 atmospheres with excellent results. IVAN III flow 

and no residual stenosis. Mynx closure of RFA.





CONCLUSIONS: 


1. Primary PCI to OM1. This could be a very late stent thrombosis.


2. Dual anti platelet therapy, statin, BB, ACE inhibitor, IV Integrilin.  


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 

with CT Angiography.


4. Echo.





MDave Grijalva MD, FACP, FACC, [facility]


Interventional Cardiology











LANDON GRIJALVA MD Mar 19, 2018 2:20 pm

## 2018-03-20 VITALS — SYSTOLIC BLOOD PRESSURE: 116 MMHG | DIASTOLIC BLOOD PRESSURE: 71 MMHG

## 2018-03-20 VITALS — SYSTOLIC BLOOD PRESSURE: 115 MMHG | DIASTOLIC BLOOD PRESSURE: 80 MMHG

## 2018-03-20 VITALS — SYSTOLIC BLOOD PRESSURE: 102 MMHG | DIASTOLIC BLOOD PRESSURE: 81 MMHG

## 2018-03-20 VITALS — DIASTOLIC BLOOD PRESSURE: 91 MMHG | SYSTOLIC BLOOD PRESSURE: 125 MMHG

## 2018-03-20 VITALS — SYSTOLIC BLOOD PRESSURE: 121 MMHG | DIASTOLIC BLOOD PRESSURE: 82 MMHG

## 2018-03-20 VITALS — DIASTOLIC BLOOD PRESSURE: 78 MMHG | SYSTOLIC BLOOD PRESSURE: 108 MMHG

## 2018-03-20 VITALS — SYSTOLIC BLOOD PRESSURE: 117 MMHG | DIASTOLIC BLOOD PRESSURE: 82 MMHG

## 2018-03-20 VITALS — SYSTOLIC BLOOD PRESSURE: 117 MMHG | DIASTOLIC BLOOD PRESSURE: 63 MMHG

## 2018-03-20 VITALS — DIASTOLIC BLOOD PRESSURE: 91 MMHG | SYSTOLIC BLOOD PRESSURE: 123 MMHG

## 2018-03-20 VITALS — DIASTOLIC BLOOD PRESSURE: 75 MMHG | SYSTOLIC BLOOD PRESSURE: 112 MMHG

## 2018-03-20 VITALS — DIASTOLIC BLOOD PRESSURE: 83 MMHG | SYSTOLIC BLOOD PRESSURE: 121 MMHG

## 2018-03-20 VITALS — SYSTOLIC BLOOD PRESSURE: 139 MMHG | DIASTOLIC BLOOD PRESSURE: 93 MMHG

## 2018-03-20 VITALS — DIASTOLIC BLOOD PRESSURE: 90 MMHG | SYSTOLIC BLOOD PRESSURE: 124 MMHG

## 2018-03-20 VITALS — DIASTOLIC BLOOD PRESSURE: 75 MMHG | SYSTOLIC BLOOD PRESSURE: 121 MMHG

## 2018-03-20 VITALS — SYSTOLIC BLOOD PRESSURE: 117 MMHG | DIASTOLIC BLOOD PRESSURE: 81 MMHG

## 2018-03-20 VITALS — SYSTOLIC BLOOD PRESSURE: 122 MMHG | DIASTOLIC BLOOD PRESSURE: 83 MMHG

## 2018-03-20 VITALS — DIASTOLIC BLOOD PRESSURE: 82 MMHG | SYSTOLIC BLOOD PRESSURE: 118 MMHG

## 2018-03-20 VITALS — SYSTOLIC BLOOD PRESSURE: 131 MMHG | DIASTOLIC BLOOD PRESSURE: 75 MMHG

## 2018-03-20 VITALS — DIASTOLIC BLOOD PRESSURE: 83 MMHG | SYSTOLIC BLOOD PRESSURE: 118 MMHG

## 2018-03-20 LAB
BASOPHILS # BLD AUTO: 0.1 10^3/UL (ref 0–0.1)
BASOPHILS NFR BLD AUTO: 0 % (ref 0–10)
BUN/CREAT SERPL: 6
CALCIUM SERPL-MCNC: 9.1 MG/DL (ref 8.5–10.1)
CHLORIDE SERPL-SCNC: 107 MMOL/L (ref 98–107)
CHOLEST SERPL-MCNC: 199 MG/DL (ref ?–200)
CO2 SERPL-SCNC: 21 MMOL/L (ref 21–32)
CREAT SERPL-MCNC: 0.86 MG/DL (ref 0.6–1.3)
EOSINOPHIL # BLD AUTO: 0.1 10^3/UL (ref 0–0.3)
EOSINOPHIL NFR BLD AUTO: 1 % (ref 0–10)
ERYTHROCYTE [DISTWIDTH] IN BLOOD BY AUTOMATED COUNT: 13.4 % (ref 10–14.5)
GFR SERPLBLD BASED ON 1.73 SQ M-ARVRAT: > 60 ML/MIN
GLUCOSE SERPL-MCNC: 156 MG/DL (ref 70–105)
HCT VFR BLD CALC: 41 % (ref 40–54)
HDLC SERPL-MCNC: 23 MG/DL (ref 40–60)
HGB BLD-MCNC: 14.2 G/DL (ref 13.3–17.7)
LYMPHOCYTES # BLD AUTO: 1.9 X 10^3 (ref 1–4)
LYMPHOCYTES NFR BLD AUTO: 13 % (ref 12–44)
MAGNESIUM SERPL-MCNC: 1.9 MG/DL (ref 1.8–2.4)
MANUAL DIFFERENTIAL PERFORMED BLD QL: NO
MCH RBC QN AUTO: 31 PG (ref 25–34)
MCHC RBC AUTO-ENTMCNC: 35 G/DL (ref 32–36)
MCV RBC AUTO: 90 FL (ref 80–99)
MONOCYTES # BLD AUTO: 1 X 10^3 (ref 0–1)
MONOCYTES NFR BLD AUTO: 7 % (ref 0–12)
NEUTROPHILS # BLD AUTO: 11.1 X 10^3 (ref 1.8–7.8)
NEUTROPHILS NFR BLD AUTO: 78 % (ref 42–75)
PHOSPHATE SERPL-MCNC: 2.7 MG/DL (ref 2.3–4.7)
PLATELET # BLD: 300 10^3/UL (ref 130–400)
PMV BLD AUTO: 10 FL (ref 7.4–10.4)
POTASSIUM SERPL-SCNC: 3.8 MMOL/L (ref 3.6–5)
RBC # BLD AUTO: 4.59 10^6/UL (ref 4.35–5.85)
SODIUM SERPL-SCNC: 136 MMOL/L (ref 135–145)
TRIGL SERPL-MCNC: 114 MG/DL (ref ?–150)
VLDLC SERPL CALC-MCNC: 23 MG/DL (ref 5–40)
WBC # BLD AUTO: 14.1 10^3/UL (ref 4.3–11)

## 2018-03-20 RX ADMIN — INSULIN HUMAN SCH UNIT: 100 INJECTION, SOLUTION PARENTERAL at 11:19

## 2018-03-20 RX ADMIN — SODIUM CHLORIDE SCH MLS/HR: 900 INJECTION, SOLUTION INTRAVENOUS at 16:10

## 2018-03-20 RX ADMIN — INSULIN HUMAN SCH UNIT: 100 INJECTION, SOLUTION PARENTERAL at 16:09

## 2018-03-20 RX ADMIN — METOPROLOL TARTRATE SCH MG: 50 TABLET, FILM COATED ORAL at 08:27

## 2018-03-20 RX ADMIN — HYDROCODONE BITARTRATE AND ACETAMINOPHEN PRN TAB: 5; 325 TABLET ORAL at 22:57

## 2018-03-20 RX ADMIN — HYDROCODONE BITARTRATE AND ACETAMINOPHEN PRN TAB: 5; 325 TABLET ORAL at 03:50

## 2018-03-20 RX ADMIN — INSULIN HUMAN SCH UNIT: 100 INJECTION, SOLUTION PARENTERAL at 05:28

## 2018-03-20 RX ADMIN — EPTIFIBATIDE SCH MLS/HR: 0.75 INJECTION INTRAVENOUS at 07:00

## 2018-03-20 RX ADMIN — METOPROLOL TARTRATE SCH MG: 50 TABLET, FILM COATED ORAL at 20:50

## 2018-03-20 RX ADMIN — ATORVASTATIN CALCIUM SCH MG: 80 TABLET, FILM COATED ORAL at 20:50

## 2018-03-20 RX ADMIN — CLOPIDOGREL BISULFATE SCH MG: 75 TABLET, FILM COATED ORAL at 08:27

## 2018-03-20 RX ADMIN — ASPIRIN SCH MG: 81 TABLET ORAL at 08:27

## 2018-03-20 RX ADMIN — LISINOPRIL SCH MG: 20 TABLET ORAL at 08:27

## 2018-03-20 RX ADMIN — INSULIN HUMAN SCH UNIT: 100 INJECTION, SOLUTION PARENTERAL at 20:54

## 2018-03-20 RX ADMIN — SODIUM CHLORIDE SCH MLS/HR: 900 INJECTION, SOLUTION INTRAVENOUS at 05:26

## 2018-03-20 NOTE — CARDIOLOGY PROGRESS NOTE
Cardiology SOAP Progress Note


Subjective:


noncardiac chest pain likely either due to CPR or pericarditis.  Chest pain 

with breathing.





Objective:


I&O/Vital Signs





Vital Sign - Last 12Hours








 3/19/18 3/20/18 3/20/18 3/20/18





 23:30 00:00 01:00 01:00


 


Temp  98.2  


 


Pulse  80 91 91


 


Resp  22 19 


 


B/P (MAP)  131/75 (93) 121/83 (96) 


 


Pulse Ox 98 99 96 


 


O2 Delivery Room Air Room Air Room Air 


 


    





 3/20/18 3/20/18 3/20/18 3/20/18





 02:00 03:00 03:30 04:00


 


Temp    99.2


 


Pulse 89 95  77


 


Resp 20 22  12


 


B/P (MAP) 117/81 (93) 123/91 (102)  125/91 (102)


 


Pulse Ox 96 96 98 98


 


O2 Delivery Room Air Room Air Room Air Room Air


 


    





 3/20/18 3/20/18 3/20/18 3/20/18





 05:00 06:00 07:00 07:00


 


Pulse 79 82 96 95


 


Resp 17 19  18


 


B/P (MAP) 112/75 (87) 117/82 (94)  121/75 (90)


 


Pulse Ox 94 97  98


 


O2 Delivery Room Air Room Air  Room Air





 3/20/18 3/20/18 3/20/18 





 08:27 08:30 09:00 


 


Temp 98.0   


 


Pulse 81  80 


 


Resp 12  18 


 


B/P (MAP) 122/83 (96)  118/82 (94) 


 


Pulse Ox 98  97 


 


O2 Delivery Room Air Room Air Room Air 














Intake and Output 


 


 3/20/18





 00:00


 


Intake Total 1600 ml


 


Output Total 1150 ml


 


Balance 450 ml








Weight (Pounds):  161


Weight (Ounces):  2.6


Weight (Calculated Kilograms):  73.400371


Constitutional:  AAO x 3, well-developed


Respiratory:  No accessory muscle use, No respiratory distress, No chest tender

, No chest expansion is symmetric, chest is bilaterally symmetric, No lungs 

clear to percussion, lungs clear to auscultation, No crackles, No rhonchi, No 

rales, No stridor, No wheezing, No pleural rub, No other


Cardiovascular:  regular rate-rhythm, No irregularly irregular, No extra beats, 

No parasternal heave is noted, No JVD, No edema, No bradycardia, No tachycardia

, No point of maximal impulse, No cardiac thrills are palpable, S1 and S2, No 

gallop/S3, No gallop/S4, No diastolic murmur, No systolic murmur, No friction 

rub, No click, No other


Gastrointestional:  No tender, No soft, No round, No distended, No pulsatile 

mass, No organomegaly, No guarding, No rebound, No tenderness, No hernia, No 

mass, No audible bowel sounds, No abnormal bowel sounds, No abdominal bruits, 

No spleenomegaly, No other


Extremities:  No normal range of motion, No non-tender, No normal inspection, 

No pedal edema, No calf tenderness, No normal capillary refill, No pelvis stable

, No calf tenderness, No inflammation, No pedal edema, No slow capillary refill

, No swelling, No other, No abrasion, No clubbing, No cyanosis, No ecchymosis, 

No laceration, No no lower extremity edema bilateral, No significant edema, No 

tenderness, No wound


Neurologic/Psychiatric:  No CNs II-XII nml as tested, No no motor/sensory 

deficits, alert, normal mood/affect, oriented x 3, No abnormal cerebellar tests

, No abnormal CNs II-XII, No abnormal gait, No aphasia, No EOM palsy, No facial 

droop, No motor weakness, No sensory deficit, No depressed affect, No 

disoriented x 3, No other, No grossly intact, No power is 5/5 both on sides


Skin:  No normal color, No warm/dry, No cyanosis, No cool, No diaphoresis, No 

damp, No ecchymosis, No jaundice, No mottled, No pallor, No rash, No tattoos/

piercings, No ulcerations, No rash on exposed areas, No ulcerations on exposed 

areas, No other





Results/Procedures:


Labs


Laboratory Tests


3/19/18 15:20: 


White Blood Count 19.1H, Red Blood Count 4.75, Hemoglobin 14.8, Hematocrit 42, 

Mean Corpuscular Volume 89, Mean Corpuscular Hemoglobin 31, Mean Corpuscular 

Hemoglobin Concent 35, Red Cell Distribution Width 13.3, Platelet Count 290, 

Mean Platelet Volume 10.1, Neutrophils (%) (Auto) 87H, Lymphocytes (%) (Auto) 8L

, Monocytes (%) (Auto) 5, Eosinophils (%) (Auto) 0, Basophils (%) (Auto) 0, 

Neutrophils # (Auto) 16.6H, Lymphocytes # (Auto) 1.4, Monocytes # (Auto) 0.9, 

Eosinophils # (Auto) 0.1, Basophils # (Auto) 0.1, Neutrophils % (Manual) 86, 

Lymphocytes % (Manual) 9, Monocytes % (Manual) 4, Eosinophils % (Manual) 0, 

Basophils % (Manual) 0, Band Neutrophils 1, Blood Morphology Comment NORMAL, 

Prothrombin Time 14.8H, INR Comment 1.2, Activated Partial Thromboplast Time 127

*H, Sodium Level 132L, Potassium Level 3.9, Chloride Level 103, Carbon Dioxide 

Level 21, Anion Gap 8, Blood Urea Nitrogen 7, Creatinine 0.86, Estimat 

Glomerular Filtration Rate > 60, BUN/Creatinine Ratio 8, Glucose Level 120H, 

Calcium Level 8.9, Magnesium Level 1.9, Total Bilirubin 0.7, Aspartate Amino 

Transf (AST/SGOT) 41H, Alanine Aminotransferase (ALT/SGPT) 29, Alkaline 

Phosphatase 117, Myoglobin 543.3H, Troponin I 3.64*H, Total Protein 7.4, 

Albumin 3.8, Serum Alcohol < 10


3/19/18 20:13: Glucometer 108


3/19/18 21:10: Troponin I 18.14*H


3/20/18 03:00: 


White Blood Count 14.1H, Red Blood Count 4.59, Hemoglobin 14.2, Hematocrit 41, 

Mean Corpuscular Volume 90, Mean Corpuscular Hemoglobin 31, Mean Corpuscular 

Hemoglobin Concent 35, Red Cell Distribution Width 13.4, Platelet Count 300, 

Mean Platelet Volume 10.0, Neutrophils (%) (Auto) 78H, Lymphocytes (%) (Auto) 13

, Monocytes (%) (Auto) 7, Eosinophils (%) (Auto) 1, Basophils (%) (Auto) 0, 

Neutrophils # (Auto) 11.1H, Lymphocytes # (Auto) 1.9, Monocytes # (Auto) 1.0, 

Eosinophils # (Auto) 0.1, Basophils # (Auto) 0.1, Sodium Level 136, Potassium 

Level 3.8, Chloride Level 107, Carbon Dioxide Level 21, Anion Gap 8, Blood Urea 

Nitrogen 5L, Creatinine 0.86, Estimat Glomerular Filtration Rate > 60, BUN/

Creatinine Ratio 6, Glucose Level 156H, Calcium Level 9.1, Magnesium Level 1.9, 

Troponin I 15.43*H, Phosphorus Level 2.7, Triglycerides Level 114, Cholesterol 

Level 199, LDL Cholesterol Direct 161H, VLDL Cholesterol 23, HDL Cholesterol 23L


3/20/18 05:28: Glucometer 140H








A/P:


Assessment/Dx:


STEMI,status post primary PCI to OM 1 artery.


Cardiac Arrest,


Leucocytosis,


active smoking,


Hypertension,


Hyperlipidemia.


Plan:


Acute inferior STEMI with polymorphic VT/ventricular fibrillation.  Treated in 

the emergency department with defibrillation which was successful.  CPR less 

than 1 minute.  Immediate normal mental status.  EKG showed inferior ST 

elevation MI catheter lab activated.  Plavix and aspirin bolus given.  IV 

amiodarone given.  primary PCI to OM1 artery with Xience Alpine SHAMAR [id number]. 

Stent was dilated with a 3.0 noncompliant balloon.  Integrilin overnight. 

continue dual antiplatelet therapy long-term. review echocardiogram.





Dyslipidemia: High-dose statin.





Active smoking: Strongly recommended to quit.





Hypertension: On lisinopril and beta blocker.





Leukocytosis likely secondary to acute MI.





Discussed at length with the patient and family about lifestyle changes as well 

as medicine compliance.








Thank you for your consultation. Please call me if you have any questions.








ORLY Grijalva MD, FACP, FACC, FSCAI, FHRS, CCDS


Interventional Cardiology


Cardiac Electrophysiology


Vascular Medicine and Endovascular Interventions











LANDON GRIJALVA MD Mar 20, 2018 9:41 am

## 2018-03-21 VITALS — DIASTOLIC BLOOD PRESSURE: 76 MMHG | SYSTOLIC BLOOD PRESSURE: 128 MMHG

## 2018-03-21 VITALS — DIASTOLIC BLOOD PRESSURE: 91 MMHG | SYSTOLIC BLOOD PRESSURE: 131 MMHG

## 2018-03-21 VITALS — DIASTOLIC BLOOD PRESSURE: 57 MMHG | SYSTOLIC BLOOD PRESSURE: 131 MMHG

## 2018-03-21 VITALS — SYSTOLIC BLOOD PRESSURE: 125 MMHG | DIASTOLIC BLOOD PRESSURE: 96 MMHG

## 2018-03-21 LAB
ALBUMIN SERPL-MCNC: 3.9 GM/DL (ref 3.2–4.5)
ALP SERPL-CCNC: 109 U/L (ref 40–136)
ALT SERPL-CCNC: 25 U/L (ref 0–55)
BILIRUB SERPL-MCNC: 0.8 MG/DL (ref 0.1–1)
BUN/CREAT SERPL: 7
CALCIUM SERPL-MCNC: 9.4 MG/DL (ref 8.5–10.1)
CHLORIDE SERPL-SCNC: 103 MMOL/L (ref 98–107)
CO2 SERPL-SCNC: 27 MMOL/L (ref 21–32)
CREAT SERPL-MCNC: 0.85 MG/DL (ref 0.6–1.3)
GFR SERPLBLD BASED ON 1.73 SQ M-ARVRAT: > 60 ML/MIN
GLUCOSE SERPL-MCNC: 110 MG/DL (ref 70–105)
POTASSIUM SERPL-SCNC: 3.8 MMOL/L (ref 3.6–5)
PROT SERPL-MCNC: 7.6 GM/DL (ref 6.4–8.2)
SODIUM SERPL-SCNC: 137 MMOL/L (ref 135–145)

## 2018-03-21 RX ADMIN — ASPIRIN SCH MG: 81 TABLET ORAL at 09:55

## 2018-03-21 RX ADMIN — CLOPIDOGREL BISULFATE SCH MG: 75 TABLET, FILM COATED ORAL at 09:55

## 2018-03-21 RX ADMIN — LISINOPRIL SCH MG: 20 TABLET ORAL at 09:55

## 2018-03-21 RX ADMIN — METOPROLOL TARTRATE SCH MG: 50 TABLET, FILM COATED ORAL at 09:55

## 2018-03-21 NOTE — CARDIOLOGY PROGRESS NOTE
Cardiology SOAP Progress Note


Subjective:


No further chest pain.





Objective:


I&O/Vital Signs





Vital Sign - Last 12Hours








 3/21/18 3/21/18 3/21/18 3/21/18





 01:00 04:00 07:00 08:00


 


Temp  98.1  


 


Pulse 71 74 73 84


 


Resp  20  


 


B/P (MAP)  125/96 (106)  131/57 (81)


 


Pulse Ox  95  


 


O2 Delivery  Room Air  Room Air


 


    





 3/21/18 3/21/18  





 08:00 08:00  


 


Temp  98.4  


 


Resp 16   


 


Pulse Ox 96   


 


O2 Delivery Room Air   














Intake and Output 


 


 3/21/18





 00:00


 


Intake Total 1497 ml


 


Output Total 0 ml


 


Balance 1497 ml








Weight (Pounds):  160


Weight (Ounces):  7.0


Weight (Calculated Kilograms):  72.230509


Constitutional:  AAO x 3, well-developed


Respiratory:  No accessory muscle use, No respiratory distress, No chest tender

, No chest expansion is symmetric, chest is bilaterally symmetric, No lungs 

clear to percussion, lungs clear to auscultation, No crackles, No rhonchi, No 

rales, No stridor, No wheezing, No pleural rub, No other


Cardiovascular:  regular rate-rhythm, No irregularly irregular, No extra beats, 

No parasternal heave is noted, No JVD, No edema, No bradycardia, No tachycardia

, No point of maximal impulse, No cardiac thrills are palpable, S1 and S2, No 

gallop/S3, No gallop/S4, No diastolic murmur, No systolic murmur, No friction 

rub, No click, No other


Gastrointestional:  No tender, No soft, No round, No distended, No pulsatile 

mass, No organomegaly, No guarding, No rebound, No tenderness, No hernia, No 

mass, No audible bowel sounds, No abnormal bowel sounds, No abdominal bruits, 

No spleenomegaly, No other


Extremities:  No normal range of motion, No non-tender, No normal inspection, 

No pedal edema, No calf tenderness, No normal capillary refill, No pelvis stable

, No calf tenderness, No inflammation, No pedal edema, No slow capillary refill

, No swelling, No other, No abrasion, No clubbing, No cyanosis, No ecchymosis, 

No laceration, No no lower extremity edema bilateral, No significant edema, No 

tenderness, No wound


Neurologic/Psychiatric:  No CNs II-XII nml as tested, No no motor/sensory 

deficits, alert, normal mood/affect, oriented x 3, No abnormal cerebellar tests

, No abnormal CNs II-XII, No abnormal gait, No aphasia, No EOM palsy, No facial 

droop, No motor weakness, No sensory deficit, No depressed affect, No 

disoriented x 3, No other, No grossly intact, No power is 5/5 both on sides


Skin:  No normal color, No warm/dry, No cyanosis, No cool, No diaphoresis, No 

damp, No ecchymosis, No jaundice, No mottled, No pallor, No rash, No tattoos/

piercings, No ulcerations, No rash on exposed areas, No ulcerations on exposed 

areas, No other





Results/Procedures:


Labs


Laboratory Tests


3/20/18 15:20: Glucometer 96


3/20/18 20:54: Glucometer 116H


3/21/18 10:02: 


Sodium Level 137, Potassium Level 3.8, Chloride Level 103, Carbon Dioxide Level 

27, Anion Gap 7, Blood Urea Nitrogen 6L, Creatinine 0.85, Estimat Glomerular 

Filtration Rate > 60, BUN/Creatinine Ratio 7, Glucose Level 110H, Calcium Level 

9.4, Total Bilirubin 0.8, Aspartate Amino Transf (AST/SGOT) 39H, Alanine 

Aminotransferase (ALT/SGPT) 25, Alkaline Phosphatase 109, Troponin I 5.13*H, 

Total Protein 7.6, Albumin 3.9





Microbiology


3/19/18 MRSA Screen - Final, Complete


          MRSA not isolated








A/P:


Assessment/Dx:


STEMI,status post primary PCI to OM 1 artery.


Cardiac Arrest,


Leucocytosis,


active smoking,


Hypertension,


Hyperlipidemia.


Anomalous RCA.


Plan:


Acute inferior STEMI with polymorphic VT/ventricular fibrillation.  Treated in 

the emergency department with defibrillation which was successful.  CPR less 

than 1 minute.  Immediate normal mental status.  EKG showed inferior ST 

elevation MI catheter lab activated.  Plavix and aspirin bolus given.  IV 

amiodarone given.  primary PCI to OM1 artery with Xience Alpine SHAMAR [id number]. 

Stent was dilated with a 3.0 noncompliant balloon.  Integrilin overnight. 

continue dual antiplatelet therapy long-term.  Echocardiogram showed normal LV 

function.





Anomalous RCA: We'll perform a CT angiography of the chest to rule out 

malignant course of the RCA.





Dyslipidemia: High-dose statin.





Active smoking: Strongly recommended to quit.





Hypertension: On lisinopril and beta blocker.





Leukocytosis likely secondary to acute MI.





Discussed at length with the patient and family about lifestyle changes as well 

as medicine compliance.








Thank you for your consultation. Please call me if you have any questions.








ORLY Grijalva MD, FACP, FACC, FSCAI, FHRS, CCDS


Interventional Cardiology


Cardiac Electrophysiology


Vascular Medicine and Endovascular Interventions











LANDON GRIJALVA MD Mar 21, 2018 11:59

## 2018-03-21 NOTE — CARDIOLOGY DISCHARGE SUMMARY
Diagnosis/Chief Complaint


Date of Admission


3/19/2018


Date of Discharge


3/21/2018


Admission Diagnosis


Inferior STEMI, ventricular fibrillation





Final/Discharge Diagnosis


Primary PCI to OM artery,


Anomalous RCA with malignant course.





Chief Complaint/HPI


Chief Complaint/HPI


This is a 48-year-old gentleman who has previous history of CAD with stent in 

OM1 in 2013. A Promus 3.0x20mm SHAMAR was placed by Dr Lang in 2013. Dr Holt did coronary angiography in 10/2013 for exertional chest pain which did 

not reveal any significant CAD. Patient has been non-compliant with 

medications. He has been chest pain x 3 weeks. However, he has been having 

severe chest pain x 48 hours. Today he started having severe chest pain since 

at 7am. Intensity 11 out 10. Substernal. No response. No other cardiac symptoms.





Discharge Summary


Procedures


Coronary angiography showed occluded OM artery which was treated with primary 

PCI with drug-eluting stent.


Anomalous RCA with malignant course confirmed by CT angiography.


Discharge Physical Examination


Unremarkable





Hospital Course


No further chest pain, stable groin.


Stable cardiovascular examination.


Radiology Reviewed


CT angiography anomalous RCA with malignant course.





Discussion & Recommendations


Discussion


I discussed at length with the patient and wife and recommended a hospital to 

hospital transfer for cardiac surgery consultation at [facility].  

However the patient refused and was adamant to get discharged.  All the risks 

were explained in detail.  The patient still wanted to get discharged and 

follow up with cardiac surgery as an outpatient.  He was strongly recommended 

to not exert.  Detailed explanations given.  Discharge took over 30 minutes to 

complete.


Follow up appt.:  


Dr. Grijalva in the next one week.


Dicharge Diet:  Cardiac Diet


Activity as Tolerated:  Yes


Home Medications


Reviewed patient Home Medication Reconciliation performed by pharmacy 

medication reconciliations technician and/or nursing.


Patients Allergies have been reviewed.





Discharge


Home Medications:


Reviewed and agree with Discharge Medication list on patient's Discharge 

Instruction sheet


Condition at discharge


.stable


Instructions to patient/family


Dr Grijalva in one week





Clinical Quality Measures


DVT/VTE Risk/Contraindication:


Risk Factor Score Per Nursin


RFS Level Per Nursing on Admit:  2=Moderate











LANDON GRIJALVA MD Mar 21, 2018 20:40

## 2018-03-21 NOTE — DISCHARGE INST-POST CATH
Discharge Inst-CATH


Post Cardiac Cath D/C Inst


Follow Up/Plan


Dr Grijalva in one week


CARDIAC CATH DISCHARGE INSTRUCTIONS





*Hold Metformin for 48 hours post heart cath.





ACTIVITY





* Go Home directly and rest.


* Limit activity of the leg (or wrist if it was used) for 7 days including 

aerobics, swimming,


   jogging, bicycling, etc.


* Restrict stair-climbing for 7 days if possible, if not, climb up with your non

-cath leg, then


   bring together on the same step.


* Avoid lifting, pushing, pulling or excessive movement of the affected 

extremity for 7 days.


* Customary sexual activity may be resumed after 2 days-use caution not to use 

a position  


   that strains or causes pain to the affected extremity.


* No driving for 24 hours.


* NO SMOKING. 


* Avoid straining for bowel movements for 7 days.


* Gentle walking on level ground is allowed.


* Returning to work will depend on the type of procedure and the results. Your 

doctor will discuss


   this with you.





CALL YOUR DOCTOR FOR ANY OF THE FOLLOWING:





*If bleeding from the puncture site occurs- Apply gentle pressure to site with 

clean cloth and call


   your doctor or EMS.


* If a knot or lump forms under the skin, increases in size, or causes pain.


* If bruising appears to be worsening or moving further down your leg instead 

of disappearing.


* Temperature above 101 F.





CARE OF YOUR GROIN INCISION;





* Bruising or purple discoloration of the skin near the puncture site is common.


* You may shower only, no bathtub bathing for 5 days.  Be careful to avoid 

slipping as your


   leg may feel stiff.


* If a closure device was used on your femoral artery, please see the attached 

guide regarding


   care of the device and your leg.


* REMOVE the dressing from your groin the next day after your procedure in the 

shower.





CARE OF YOUR WRIST INCISION;





* Bruising or purple discoloration of the skin near the puncture site is common.


* You may shower.


* DO NOT submerge wrist.


* Remove dressing in 24 hours.











LANDON GRIJALVA MD Mar 21, 2018 20:34

## 2018-04-09 ENCOUNTER — HOSPITAL ENCOUNTER (OUTPATIENT)
Dept: HOSPITAL 75 - CATH | Age: 48
Discharge: HOME | End: 2018-04-09
Attending: INTERNAL MEDICINE
Payer: COMMERCIAL

## 2018-04-09 VITALS — DIASTOLIC BLOOD PRESSURE: 83 MMHG | SYSTOLIC BLOOD PRESSURE: 117 MMHG

## 2018-04-09 VITALS — DIASTOLIC BLOOD PRESSURE: 81 MMHG | SYSTOLIC BLOOD PRESSURE: 128 MMHG

## 2018-04-09 VITALS — DIASTOLIC BLOOD PRESSURE: 65 MMHG | SYSTOLIC BLOOD PRESSURE: 125 MMHG

## 2018-04-09 VITALS — SYSTOLIC BLOOD PRESSURE: 113 MMHG | DIASTOLIC BLOOD PRESSURE: 70 MMHG

## 2018-04-09 VITALS — BODY MASS INDEX: 24.31 KG/M2 | WEIGHT: 160.44 LBS | HEIGHT: 68 IN

## 2018-04-09 VITALS — SYSTOLIC BLOOD PRESSURE: 111 MMHG | DIASTOLIC BLOOD PRESSURE: 73 MMHG

## 2018-04-09 VITALS — DIASTOLIC BLOOD PRESSURE: 70 MMHG | SYSTOLIC BLOOD PRESSURE: 131 MMHG

## 2018-04-09 VITALS — DIASTOLIC BLOOD PRESSURE: 78 MMHG | SYSTOLIC BLOOD PRESSURE: 111 MMHG

## 2018-04-09 VITALS — SYSTOLIC BLOOD PRESSURE: 115 MMHG | DIASTOLIC BLOOD PRESSURE: 73 MMHG

## 2018-04-09 VITALS — DIASTOLIC BLOOD PRESSURE: 83 MMHG | SYSTOLIC BLOOD PRESSURE: 112 MMHG

## 2018-04-09 DIAGNOSIS — I25.2: ICD-10-CM

## 2018-04-09 DIAGNOSIS — F17.210: ICD-10-CM

## 2018-04-09 DIAGNOSIS — Z11.2: ICD-10-CM

## 2018-04-09 DIAGNOSIS — Z95.5: ICD-10-CM

## 2018-04-09 DIAGNOSIS — E78.5: ICD-10-CM

## 2018-04-09 DIAGNOSIS — I10: ICD-10-CM

## 2018-04-09 DIAGNOSIS — E78.1: ICD-10-CM

## 2018-04-09 DIAGNOSIS — R07.9: Primary | ICD-10-CM

## 2018-04-09 LAB
ALBUMIN SERPL-MCNC: 3.8 GM/DL (ref 3.2–4.5)
ALP SERPL-CCNC: 130 U/L (ref 40–136)
ALT SERPL-CCNC: 11 U/L (ref 0–55)
APTT BLD: 31 SEC (ref 24–35)
BILIRUB SERPL-MCNC: 0.4 MG/DL (ref 0.1–1)
BUN/CREAT SERPL: 11
CALCIUM SERPL-MCNC: 9.5 MG/DL (ref 8.5–10.1)
CHLORIDE SERPL-SCNC: 103 MMOL/L (ref 98–107)
CHOLEST SERPL-MCNC: 126 MG/DL (ref ?–200)
CO2 SERPL-SCNC: 25 MMOL/L (ref 21–32)
CREAT SERPL-MCNC: 0.92 MG/DL (ref 0.6–1.3)
ERYTHROCYTE [DISTWIDTH] IN BLOOD BY AUTOMATED COUNT: 12.9 % (ref 10–14.5)
GFR SERPLBLD BASED ON 1.73 SQ M-ARVRAT: > 60 ML/MIN
GLUCOSE SERPL-MCNC: 97 MG/DL (ref 70–105)
HCT VFR BLD CALC: 43 % (ref 40–54)
HDLC SERPL-MCNC: 25 MG/DL (ref 40–60)
HGB BLD-MCNC: 14.6 G/DL (ref 13.3–17.7)
INR PPP: 1 (ref 0.8–1.4)
MCH RBC QN AUTO: 31 PG (ref 25–34)
MCHC RBC AUTO-ENTMCNC: 34 G/DL (ref 32–36)
MCV RBC AUTO: 90 FL (ref 80–99)
PLATELET # BLD: 426 10^3/UL (ref 130–400)
PMV BLD AUTO: 10 FL (ref 7.4–10.4)
POTASSIUM SERPL-SCNC: 4 MMOL/L (ref 3.6–5)
PROT SERPL-MCNC: 8.6 GM/DL (ref 6.4–8.2)
PROTHROMBIN TIME: 13.7 SEC (ref 12.2–14.7)
RBC # BLD AUTO: 4.75 10^6/UL (ref 4.35–5.85)
SODIUM SERPL-SCNC: 137 MMOL/L (ref 135–145)
TRIGL SERPL-MCNC: 119 MG/DL (ref ?–150)
VLDLC SERPL CALC-MCNC: 24 MG/DL (ref 5–40)
WBC # BLD AUTO: 8.9 10^3/UL (ref 4.3–11)

## 2018-04-09 PROCEDURE — 93458 L HRT ARTERY/VENTRICLE ANGIO: CPT

## 2018-04-09 PROCEDURE — 85730 THROMBOPLASTIN TIME PARTIAL: CPT

## 2018-04-09 PROCEDURE — 36415 COLL VENOUS BLD VENIPUNCTURE: CPT

## 2018-04-09 PROCEDURE — 36221 PLACE CATH THORACIC AORTA: CPT

## 2018-04-09 PROCEDURE — 85610 PROTHROMBIN TIME: CPT

## 2018-04-09 PROCEDURE — 85027 COMPLETE CBC AUTOMATED: CPT

## 2018-04-09 PROCEDURE — 80061 LIPID PANEL: CPT

## 2018-04-09 PROCEDURE — 87081 CULTURE SCREEN ONLY: CPT

## 2018-04-09 PROCEDURE — 80053 COMPREHEN METABOLIC PANEL: CPT

## 2018-04-09 NOTE — CORONARY ANGIOGRAPHY REPORT
Coronary Angiography Report


DATE OF PROCEDURE: 4/9/18 





INDICATION: recurrent chest pain with T-wave inversions, recent STEMI with PCI 

to OM1.





PREOPERATIVE DIAGNOSIS: recurrent chest pain with T-wave inversions, recent 

STEMI with PCI to OM1.





POSTOPERATIVE DIAGNOSIS: no obstructive CAD.  Patent stent.





HISTORY: this is a 48-year-old gentleman who presented with inferior STEMI and 

was found to have occluded OM1 treated with a drug-eluting stent few weeks ago.

  The site of the occlusion was a previously placed stent a few years ago.  

There was also an anomalous RCA with malignant course however it was 

nondominant and small vessel.  CT angiography had confirmed malignant course of 

RCA between the PA and aorta. cardiac surgery consultation was taken and the 

patient was deemed not graftable and RCA are very unlikely cause of sudden 

cardiac death risk in this patient.  The patient presented for follow-up visit 

and was complaining of recurrent chest pain and EKG showed symmetrical T-wave 

inversions.Therefore, the patient was scheduled for coronary angiography. 





PROCEDURES PERFORMED: 


1.Coronary angiography. 


2.Left heart catheterization. 


3.  Aortic arch angiography





COMPLICATIONS: None. 


SPECIMENS: None. 


ESTIMATED BLOOD LOSS: 10 mL


ANESTHESIA: Conscious sedation


ANTICOAGULATION: none


CONTRAST: 75 mL.


FLUOROSCOPY: 2.38 minutes.


FLOUROSCOPY DOSE: 351 mgy.





PROCEDURE DETAILS: The patient is a 48  male  and was brought to the cath lab 

after informed consent was taken. All the risks and complications were 

explained in detail; this included the risk of bleeding, vascular damage, stroke

, MI and even death. The patient was draped and prepped in the usual sterile 

fashion. Kip's test was abnormal therefore access was gained in the right 

femoral artery with a 5 Pashto sheath.  Coronary angiography and left heart 

catheterization was performed with JR4 and JL4 catheter.  Aortic arch angiogram 

was performed with a pigtail catheter.  Known anomalous origin of the RCA; 

nonselective shots taken with a JR4 catheter.





FINDINGS: 


1.Left main: patent.


2.LAD: patent.  Transapical vessel.


3.Left circumflex artery: patent stents in the OM artery.


4.RCA: nonselective views show no severe disease in the proximal segment.  

Small nondominant RCA.


5.Left heart catheterization: aortic pressure 86/55 mmHg.  LV pressure 98/3 

mmHg.  LVEDP 10 mmHg.  Normal LV function with no wall motion 90s.  No gradient 

across the aortic valve.


6.  Aortic arch angiogram: No aneurysm or dissection noted.  Normal proximal 

segments of the great arteries including brachiocephalic artery, left common 

carotid artery and left subclavian artery.





CONCLUSIONS: 


Patent stents with no severe CAD noted.


Anomalous RCA which is nondominant and small.





ORLY Grijalva MD, FACP, FACC, [facility]


Interventional Cardiology











LANDON GRIJALVA MD Apr 9, 2018 1:24 pm

## 2018-04-09 NOTE — CARDIOLOGY DISCHARGE SUMMARY
Diagnosis/Chief Complaint


Date of Admission


4/9/2018


Date of Discharge


4/9/2018


Admission Diagnosis


recurrent chest pain with T-wave inversions, recent STEMI with PCI to OM1.





Final/Discharge Diagnosis


patent stent.  No obstructive CAD





Chief Complaint/HPI


Chief Complaint/HPI


this is a 48-year-old gentleman with history of recent STEMI with PCI to OM1 

artery.  The site of occlusion was at a previous OM one stent.  Excellent 

results post PCI.  Anomalous RCA with malignant course.  The vessel was 

nondominant.  Cardiac surgery consultation was done.  Vessel is very small and 

not graftable.  Very small risk of sudden cardiac death from this RCA.  Patient 

resented to an office visit with recurrent chest pain and T-wave inversions on 

EKG.  Coronary angiography was recommended.





Discharge Summary


Procedures


coronary angiography showed patent stent in OM1 artery.  No obstructive CAD.  

Anomalous RCA.


Discharge Physical Examination


stable cardiovascular respiratory examination.





Hospital Course


unremarkable


Pending Labs


Laboratory Tests


4/9/18 11:56: 


White Blood Count 8.9, Red Blood Count 4.75, Hemoglobin 14.6, Hematocrit 43, 

Mean Corpuscular Volume 90, Mean Corpuscular Hemoglobin 31, Mean Corpuscular 

Hemoglobin Concent 34, Red Cell Distribution Width 12.9, Platelet Count 426, 

Mean Platelet Volume 10.0, Prothrombin Time 13.7, INR Comment 1.0, Activated 

Partial Thromboplast Time 31, Sodium Level 137, Potassium Level 4.0, Chloride 

Level 103, Carbon Dioxide Level 25, Anion Gap 9, Blood Urea Nitrogen 10, 

Creatinine 0.92, Estimat Glomerular Filtration Rate > 60, BUN/Creatinine Ratio 

11, Glucose Level 97, Calcium Level 9.5, Total Bilirubin 0.4, Aspartate Amino 

Transf (AST/SGOT) 13, Alanine Aminotransferase (ALT/SGPT) 11, Alkaline 

Phosphatase 130, Total Protein 8.6, Albumin 3.8, Triglycerides Level 119, 

Cholesterol Level 126, LDL Cholesterol Direct 82, VLDL Cholesterol 24, HDL 

Cholesterol 25








Discussion & Recommendations


Discussion


discussed at length with patient and family.  Continue dual antiplatelet 

therapy.  Tobacco cessation.  Follow up in office in 3-4 weeks.


Follow up appt.:  


Dr. Grijalva in 3-4 weeks.


Dicharge Diet:  Cardiac Diet


Activity as Tolerated:  Yes


Home Medications


Reviewed patient Home Medication Reconciliation performed by pharmacy 

medication reconciliations technician and/or nursing.


Patients Allergies have been reviewed.





Discharge


Home Medications:


Reviewed and agree with Discharge Medication list on patient's Discharge 

Instruction sheet


Condition at discharge


stable.


Instructions to patient/family


Dr. Grijalva in 3-4 weeks.











LANDON GRIJALVA MD Apr 9, 2018 1:31 pm

## 2018-04-09 NOTE — DISCHARGE INST-POST CATH
Discharge Inst-CATH


Post Cardiac Cath D/C Inst


Follow Up/Plan


Dr. Grijalva in 3-4 weeks.


CARDIAC CATH DISCHARGE INSTRUCTIONS





*Hold Metformin for 48 hours post heart cath.





ACTIVITY





* Go Home directly and rest.


* Limit activity of the leg (or wrist if it was used) for 7 days including 

aerobics, swimming,


   jogging, bicycling, etc.


* Restrict stair-climbing for 7 days if possible, if not, climb up with your non

-cath leg, then


   bring together on the same step.


* Avoid lifting, pushing, pulling or excessive movement of the affected 

extremity for 7 days.


* Customary sexual activity may be resumed after 2 days-use caution not to use 

a position  


   that strains or causes pain to the affected extremity.


* No driving for 24 hours.


* NO SMOKING. 


* Avoid straining for bowel movements for 7 days.


* Gentle walking on level ground is allowed.


* Returning to work will depend on the type of procedure and the results. Your 

doctor will discuss


   this with you.





CALL YOUR DOCTOR FOR ANY OF THE FOLLOWING:





*If bleeding from the puncture site occurs- Apply gentle pressure to site with 

clean cloth and call


   your doctor or EMS.


* If a knot or lump forms under the skin, increases in size, or causes pain.


* If bruising appears to be worsening or moving further down your leg instead 

of disappearing.


* Temperature above 101 F.





CARE OF YOUR GROIN INCISION;





* Bruising or purple discoloration of the skin near the puncture site is common.


* You may shower only, no bathtub bathing for 5 days.  Be careful to avoid 

slipping as your


   leg may feel stiff.


* If a closure device was used on your femoral artery, please see the attached 

guide regarding


   care of the device and your leg.


* REMOVE the dressing from your groin the next day after your procedure in the 

shower.





CARE OF YOUR WRIST INCISION;





* Bruising or purple discoloration of the skin near the puncture site is common.


* You may shower.


* DO NOT submerge wrist.


* Remove dressing in 24 hours.











LANDON GRIJALVA MD Apr 9, 2018 1:27 pm

## 2018-04-09 NOTE — CARDIAC PROCEDURE NOTE-CS/ASA
Pre-Procedure Note


Pre-Op Procedure Note


H&P Reviewed


The H&P was reviewed, patient examined and no changes noted.


Date H&P Reviewed:  Apr 9, 2018


Time H&P Reviewed:  12:30





Conscious Sedation Pre-Proced


Time Reviewed:  12:30


ASA Class:  3











Airway Mallampati Classification: ([place] appropriate class) I.  II.  III,  IV


 


Lungs 


 


Heart 


 


 ASA score


 


 ASA 1: a normal healthy patient


 


 ASA 2:  a patient with a mild systemic disease (mid diabetes, controlled 

hypertension, obesity 


 


 ASA 3:  a patient with a severe systemic disease that limits activity  (angina

, COPD, prior Myocardial infarction)


 


 ASA 4:  a patient with an incapacitating disease that is a constant threat to 

life (CHF, renal failure)


 


 ASA 5:  a moribund patient not expected to survive 24 hrs.  (ruptured aneurysm)


 


 ASA 6:  a declared brain dead patient whose organs are being harvested.


 


 For emergent operations, add the letter E after the classification








Grade 1


Sedation Plan:  Analgesia, Amnesia, Plan communicated to team members, 

Discussed options with patient/fam, Discussed risks with patient/fam


Note


The patient is an appropriate candidate to undergo the planned procedure, 

sedation, and anesthesia.





The patient immediately re-assessed prior to indication.











LANDON KILPATRICK MD Apr 9, 2018 1:15 pm

## 2018-07-19 ENCOUNTER — HOSPITAL ENCOUNTER (OUTPATIENT)
Dept: HOSPITAL 75 - ER | Age: 48
Setting detail: OBSERVATION
LOS: 1 days | Discharge: HOME | End: 2018-07-20
Attending: FAMILY MEDICINE | Admitting: FAMILY MEDICINE
Payer: COMMERCIAL

## 2018-07-19 VITALS — DIASTOLIC BLOOD PRESSURE: 90 MMHG | SYSTOLIC BLOOD PRESSURE: 136 MMHG

## 2018-07-19 VITALS — WEIGHT: 156.31 LBS | HEIGHT: 68 IN | BODY MASS INDEX: 23.69 KG/M2

## 2018-07-19 DIAGNOSIS — E78.00: ICD-10-CM

## 2018-07-19 DIAGNOSIS — R00.2: ICD-10-CM

## 2018-07-19 DIAGNOSIS — I25.10: ICD-10-CM

## 2018-07-19 DIAGNOSIS — Z87.442: ICD-10-CM

## 2018-07-19 DIAGNOSIS — R07.9: Primary | ICD-10-CM

## 2018-07-19 DIAGNOSIS — F10.129: ICD-10-CM

## 2018-07-19 DIAGNOSIS — F12.10: ICD-10-CM

## 2018-07-19 DIAGNOSIS — F17.210: ICD-10-CM

## 2018-07-19 DIAGNOSIS — F32.9: ICD-10-CM

## 2018-07-19 DIAGNOSIS — I10: ICD-10-CM

## 2018-07-19 DIAGNOSIS — F41.9: ICD-10-CM

## 2018-07-19 DIAGNOSIS — I25.2: ICD-10-CM

## 2018-07-19 DIAGNOSIS — E78.5: ICD-10-CM

## 2018-07-19 LAB
ALBUMIN SERPL-MCNC: 4.2 GM/DL (ref 3.2–4.5)
ALP SERPL-CCNC: 104 U/L (ref 40–136)
ALT SERPL-CCNC: 11 U/L (ref 0–55)
APAP SERPL-MCNC: < 10 UG/ML (ref 10–30)
APTT BLD: 30 SEC (ref 24–35)
APTT PPP: YELLOW S
BACTERIA #/AREA URNS HPF: (no result) /HPF
BARBITURATES UR QL: NEGATIVE
BASOPHILS # BLD AUTO: 0.1 10^3/UL (ref 0–0.1)
BASOPHILS NFR BLD AUTO: 1 % (ref 0–10)
BENZODIAZ UR QL SCN: NEGATIVE
BILIRUB SERPL-MCNC: 0.6 MG/DL (ref 0.1–1)
BILIRUB UR QL STRIP: NEGATIVE
BUN/CREAT SERPL: 7
CALCIUM SERPL-MCNC: 9.2 MG/DL (ref 8.5–10.1)
CHLORIDE SERPL-SCNC: 108 MMOL/L (ref 98–107)
CO2 SERPL-SCNC: 19 MMOL/L (ref 21–32)
COCAINE UR QL: NEGATIVE
CREAT SERPL-MCNC: 1.05 MG/DL (ref 0.6–1.3)
EOSINOPHIL # BLD AUTO: 0.4 10^3/UL (ref 0–0.3)
EOSINOPHIL NFR BLD AUTO: 4 % (ref 0–10)
ERYTHROCYTE [DISTWIDTH] IN BLOOD BY AUTOMATED COUNT: 14 % (ref 10–14.5)
FIBRINOGEN PPP-MCNC: CLEAR MG/DL
GFR SERPLBLD BASED ON 1.73 SQ M-ARVRAT: > 60 ML/MIN
GLUCOSE SERPL-MCNC: 107 MG/DL (ref 70–105)
GLUCOSE UR STRIP-MCNC: NEGATIVE MG/DL
HCT VFR BLD CALC: 43 % (ref 40–54)
HGB BLD-MCNC: 14.9 G/DL (ref 13.3–17.7)
INR PPP: 1.1 (ref 0.8–1.4)
KETONES UR QL STRIP: NEGATIVE
LEUKOCYTE ESTERASE UR QL STRIP: NEGATIVE
LIPASE SERPL-CCNC: 42 U/L (ref 8–78)
LYMPHOCYTES # BLD AUTO: 3.2 X 10^3 (ref 1–4)
LYMPHOCYTES NFR BLD AUTO: 31 % (ref 12–44)
MAGNESIUM SERPL-MCNC: 2.1 MG/DL (ref 1.8–2.4)
MANUAL DIFFERENTIAL PERFORMED BLD QL: NO
MCH RBC QN AUTO: 32 PG (ref 25–34)
MCHC RBC AUTO-ENTMCNC: 35 G/DL (ref 32–36)
MCV RBC AUTO: 91 FL (ref 80–99)
METHADONE UR QL SCN: NEGATIVE
METHAMPHETAMINE SCREEN URINE S: NEGATIVE
MONOCYTES # BLD AUTO: 1 X 10^3 (ref 0–1)
MONOCYTES NFR BLD AUTO: 9 % (ref 0–12)
MYOGLOBIN SERPL-MCNC: 29.4 NG/ML (ref 10–92)
NEUTROPHILS # BLD AUTO: 5.7 X 10^3 (ref 1.8–7.8)
NEUTROPHILS NFR BLD AUTO: 55 % (ref 42–75)
NITRITE UR QL STRIP: NEGATIVE
OPIATES UR QL SCN: NEGATIVE
OXYCODONE UR QL: NEGATIVE
PH UR STRIP: 5 [PH] (ref 5–9)
PLATELET # BLD: 270 10^3/UL (ref 130–400)
PMV BLD AUTO: 10.2 FL (ref 7.4–10.4)
POTASSIUM SERPL-SCNC: 3.7 MMOL/L (ref 3.6–5)
PROPOXYPH UR QL: NEGATIVE
PROT SERPL-MCNC: 7.5 GM/DL (ref 6.4–8.2)
PROT UR QL STRIP: NEGATIVE
PROTHROMBIN TIME: 13.8 SEC (ref 12.2–14.7)
RBC # BLD AUTO: 4.73 10^6/UL (ref 4.35–5.85)
RBC #/AREA URNS HPF: (no result) /HPF
SALICYLATES SERPL-MCNC: < 5 MG/DL (ref 5–20)
SODIUM SERPL-SCNC: 139 MMOL/L (ref 135–145)
SP GR UR STRIP: 1.01 (ref 1.02–1.02)
SQUAMOUS #/AREA URNS HPF: (no result) /HPF
TRICYCLICS UR QL SCN: NEGATIVE
UROBILINOGEN UR-MCNC: NORMAL MG/DL
WBC # BLD AUTO: 10.3 10^3/UL (ref 4.3–11)
WBC #/AREA URNS HPF: (no result) /HPF

## 2018-07-19 PROCEDURE — 71045 X-RAY EXAM CHEST 1 VIEW: CPT

## 2018-07-19 PROCEDURE — 85025 COMPLETE CBC W/AUTO DIFF WBC: CPT

## 2018-07-19 PROCEDURE — 81000 URINALYSIS NONAUTO W/SCOPE: CPT

## 2018-07-19 PROCEDURE — 80320 DRUG SCREEN QUANTALCOHOLS: CPT

## 2018-07-19 PROCEDURE — 83690 ASSAY OF LIPASE: CPT

## 2018-07-19 PROCEDURE — 93041 RHYTHM ECG TRACING: CPT

## 2018-07-19 PROCEDURE — 84484 ASSAY OF TROPONIN QUANT: CPT

## 2018-07-19 PROCEDURE — 85730 THROMBOPLASTIN TIME PARTIAL: CPT

## 2018-07-19 PROCEDURE — 83880 ASSAY OF NATRIURETIC PEPTIDE: CPT

## 2018-07-19 PROCEDURE — 93005 ELECTROCARDIOGRAM TRACING: CPT

## 2018-07-19 PROCEDURE — 85610 PROTHROMBIN TIME: CPT

## 2018-07-19 PROCEDURE — 80061 LIPID PANEL: CPT

## 2018-07-19 PROCEDURE — 36415 COLL VENOUS BLD VENIPUNCTURE: CPT

## 2018-07-19 PROCEDURE — 83874 ASSAY OF MYOGLOBIN: CPT

## 2018-07-19 PROCEDURE — 80048 BASIC METABOLIC PNL TOTAL CA: CPT

## 2018-07-19 PROCEDURE — 83735 ASSAY OF MAGNESIUM: CPT

## 2018-07-19 PROCEDURE — 80329 ANALGESICS NON-OPIOID 1 OR 2: CPT

## 2018-07-19 PROCEDURE — 80053 COMPREHEN METABOLIC PANEL: CPT

## 2018-07-19 PROCEDURE — 87081 CULTURE SCREEN ONLY: CPT

## 2018-07-19 PROCEDURE — 96374 THER/PROPH/DIAG INJ IV PUSH: CPT

## 2018-07-19 PROCEDURE — 80306 DRUG TEST PRSMV INSTRMNT: CPT

## 2018-07-19 PROCEDURE — 85379 FIBRIN DEGRADATION QUANT: CPT

## 2018-07-19 NOTE — ED CHEST PAIN
General


Chief Complaint:  Chest Pain


Stated Complaint:  CP/BACK PAIN


Source:  patient, EMS


Exam Limitations:  no limitations


 (VALERIE MCKEON)





History of Present Illness


Date Seen by Provider:  Jul 19, 2018


Time Seen by Provider:  21:57


Initial Comments


tto ER with central chest pain and back pain since yesterday. He states "I had 

a little heart attack". He's taken 3 bottles of nitroglycerin sublingual since 

yesterday he states. He is taking these 6 at a time. He states that his heart 

doctor told him to take them as needed for chest pain. 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 

hours. He then came to  and crawled from under the house and told his wife what 

had happened  and "she said here take some nitro". he then states "I wasn't 

coming around like she thought I should so she gave him even more nitro". He is 

also had a couple of beers today. He was here in March for cardiac arrest and 

ventricular fibrillation taken to Cath Lab. He states he has not seen his heart 

doctor since he had a heart attack in March.


Timing/Duration:  1-2 days


Severity/Quality:  moderate, aching


Location:  central


Radiation:  no radiation


Activities at Onset:  none


ASA po PTA:  No


NTG SL PTA:  No


Associated Symptoms:  No diaphoresis, No nausea/vomiting (VALERIE MCKEON)





Allergies and Home Medications


Allergies


Coded Allergies:  


     No Known Drug Allergies (Unverified , 1/4/13)





Home Medications


Aspirin 81 Mg Tablet.dr, 81 MG PO DAILY, (Reported)


Atorvastatin Calcium 80 Mg Tablet, 80 MG PO HS, (Reported)


Clopidogrel Bisulfate 75 Mg Tablet, 75 MG PO DAILY, (Reported)


Lisinopril 20 Mg Tablet, 20 MG PO DAILY, (Reported)


Metoprolol Tartrate 50 Mg Tablet, 50 MG PO BID, (Reported)





Patient Home Medication List


Home Medication List Reviewed:  Yes


 (VALERIE MCKEON)





Review of Systems


Constitutional:  see HPI


EENTM:  No Symptoms Reported


Respiratory:  No Symptoms Reported


Cardiovascular:  See HPI, Chest Pain, Syncope


Gastrointestinal:  See HPI


Genitourinary:  No Symptoms Reported


Musculoskeletal:  no symptoms reported


Skin:  no symptoms reported


Psychiatric/Neurological:  No Symptoms Reported


Endocrine:  No Symptoms Reported


Hematologic/Lymphatic:  No Symptoms Reported (VALERIE MCKEON)





Past Medical-Social-Family Hx


Patient Social History


Alcohol Beverage of Choice:  Beer


Type Used:  Cigarettes


Recent Hopitalizations:  Yes


 (VALERIE MCKEON)





Immunizations Up To Date


Tetanus Booster (TDap):  Unknown


PED Vaccines UTD:  No


 (VALERIE MCKEON)





Past Medical History


Surgeries:  Yes (JAW FX REPAIR X2)


Cardiac, Coronary Stent


Respiratory:  No


Cardiac:  Yes (STENT X 1 )


Coronary Artery Disease, Heart Attack, Heart Murmur, High Cholesterol, 

Hypertension


Neurological:  No


Reproductive Disorders:  No


Sexually Transmitted Disease:  No


HIV/AIDS:  No


Genitourinary:  Yes


Kidney Stones


Gastrointestinal:  Yes (HX OF BLOOD IN STOOL)


Musculoskeletal:  Yes


Arthritis, Chronic Back Pain


Endocrine:  No


Loss of Vision:  Denies


Hearing Impairment:  Denies


Cancer:  No


Psychosocial:  Yes (hAS NOT BEEN DX W/ MENTAL DISORDER )


Anxiety, Depression


Integumentary:  No


Blood Disorders:  No


 (VALERIE MCKEON)





Family Medical History





Unknown





Physical Exam


Vital Signs





Vital Signs - First Documented























 (DENILSON ESCOBAR)


Vital Signs


Capillary Refill : Less Than 3 Seconds 


 (VALERIE MCKEON)


Height, Weight, BMI


Height: 5'8.00"


Weight: 160lbs. 7.0oz. [id number]; 24.4 BMI


Method:Estimated


General Appearance:  No Apparent Distress, WD/WN, Other (speech is slurred,)


HEENT:  PERRL/EOMI, TMs Normal


Neck:  Full Range of Motion, Normal Inspection


Respiratory:  Normal Breath Sounds, No Accessory Muscle Use, No Respiratory 

Distress


Cardiovascular:  Regular Rate, Rhythm, Normal Peripheral Pulses


Gastrointestinal:  Normal Bowel Sounds, Non Tender, Soft


Extremity:  Normal Capillary Refill, Normal Inspection


Neurologic/Psychiatric:  Alert, Normal Mood/Affect, Other (he believes that he 

drove himself to ER though he came by EMS. Claims to know one of the nurses 

from "the field". Believes it is still April.  tor has pulled his IV 

out twice.)


Skin:  Normal Color, Warm/Dry (VALERIE MCKEON)





Progress/Results/Core Measures


Results/Orders


Lab Results





Laboratory Tests








Test


 7/19/18


21:50 Range/Units


 


 


White Blood Count


 10.3 


 4.3-11.0


10^3/uL


 


Red Blood Count


 4.73 


 4.35-5.85


10^6/uL


 


Hemoglobin 14.9  13.3-17.7  G/DL


 


Hematocrit 43  40-54  %


 


Mean Corpuscular Volume 91  80-99  FL


 


Mean Corpuscular Hemoglobin 32  25-34  PG


 


Mean Corpuscular Hemoglobin


Concent 35 


 32-36  G/DL





 


Red Cell Distribution Width 14.0  10.0-14.5  %


 


Platelet Count


 270 


 130-400


10^3/uL


 


Mean Platelet Volume 10.2  7.4-10.4  FL


 


Neutrophils (%) (Auto) 55  42-75  %


 


Lymphocytes (%) (Auto) 31  12-44  %


 


Monocytes (%) (Auto) 9  0-12  %


 


Eosinophils (%) (Auto) 4  0-10  %


 


Basophils (%) (Auto) 1  0-10  %


 


Neutrophils # (Auto) 5.7  1.8-7.8  X 10^3


 


Lymphocytes # (Auto) 3.2  1.0-4.0  X 10^3


 


Monocytes # (Auto) 1.0  0.0-1.0  X 10^3


 


Eosinophils # (Auto)


 0.4 H


 0.0-0.3


10^3/uL


 


Basophils # (Auto)


 0.1 


 0.0-0.1


10^3/uL





 (DENILSON ESCOBAR)


Vital Signs/I&O











 7/19/18 7/19/18 7/19/18





 21:51 21:51 21:51


 


Temp 97.8  


 


Pulse 105  


 


Resp 17  


 


B/P (MAP) 142/107 (119)  


 


Pulse Ox 97 97 


 


O2 Delivery Room Air Room Air Room Air





 (DENILSON ESCOBAR)


Initial ECG Impression Date:  Jul 19, 2018


Initial ECG Impression Time:  22:04


Initial ECG Rate:  94


Initial ECG Rhythm:  Normal Sinus


Initial ECG Intervals:  Normal


Initial ECG Impression:  Normal


Initial ECG Comparisson:  No Previous ECG Available


Comment


No acute ST elevation or depression.


 (DENILSON ESCOBAR)





Diagnostic Imaging





   Diagonstic Imaging:  Xray


   Plain Films/CT/US/NM/MRI:  chest (1v)


Comments


Unremarkable Chest


   Reviewed:  Reviewed by Me


 (DENILSON ESCOBAR)





Departure


Communication (Admissions)


Time/Spoke to Admitting Phy:  22:47


spoke with Dr. Alfonso. We will admit to ICU,, consult cardiology.


Time/Spoke to Consulting Phy:  22:49


Dr Grijalva agrees to consult


Dr. Escobar has seen the patient with me in the emergency room and agrees with 

plan.


 (VALERIE MCKEON)





Impression





 Primary Impression:  


 Chest pain


 Qualified Codes:  R07.9 - Chest pain, unspecified


 Additional Impressions:  


 ETOH abuse


 Paranoia


Disposition:  09 ADMITTED AS INPATIENT


Condition:  Stable





Admissions


Decision to Admit Reason:  Admit from ER (General)


Decision to Admit/Date:  Jul 19, 2018


Time/Decision to Admit Time:  22:49


 (VALERIE MCKEON)





Departure-Patient Inst.


Referrals:  


[facility] [place]


Primary Care Physician











VALERIE MCKEON Jul 19, 2018 22:01


DENILSON ESCOBAR Jul 19, 2018 22:11

## 2018-07-20 VITALS — DIASTOLIC BLOOD PRESSURE: 60 MMHG | SYSTOLIC BLOOD PRESSURE: 101 MMHG

## 2018-07-20 VITALS — DIASTOLIC BLOOD PRESSURE: 64 MMHG | SYSTOLIC BLOOD PRESSURE: 106 MMHG

## 2018-07-20 VITALS — DIASTOLIC BLOOD PRESSURE: 60 MMHG | SYSTOLIC BLOOD PRESSURE: 96 MMHG

## 2018-07-20 VITALS — DIASTOLIC BLOOD PRESSURE: 79 MMHG | SYSTOLIC BLOOD PRESSURE: 121 MMHG

## 2018-07-20 VITALS — DIASTOLIC BLOOD PRESSURE: 66 MMHG | SYSTOLIC BLOOD PRESSURE: 103 MMHG

## 2018-07-20 VITALS — SYSTOLIC BLOOD PRESSURE: 115 MMHG | DIASTOLIC BLOOD PRESSURE: 89 MMHG

## 2018-07-20 VITALS — SYSTOLIC BLOOD PRESSURE: 103 MMHG | DIASTOLIC BLOOD PRESSURE: 67 MMHG

## 2018-07-20 VITALS — DIASTOLIC BLOOD PRESSURE: 80 MMHG | SYSTOLIC BLOOD PRESSURE: 115 MMHG

## 2018-07-20 LAB
BASOPHILS # BLD AUTO: 0.1 10^3/UL (ref 0–0.1)
BASOPHILS NFR BLD AUTO: 1 % (ref 0–10)
BUN/CREAT SERPL: 8
CALCIUM SERPL-MCNC: 9.1 MG/DL (ref 8.5–10.1)
CHLORIDE SERPL-SCNC: 109 MMOL/L (ref 98–107)
CHOLEST SERPL-MCNC: 230 MG/DL (ref ?–200)
CO2 SERPL-SCNC: 19 MMOL/L (ref 21–32)
CREAT SERPL-MCNC: 0.93 MG/DL (ref 0.6–1.3)
EOSINOPHIL # BLD AUTO: 0.4 10^3/UL (ref 0–0.3)
EOSINOPHIL NFR BLD AUTO: 6 % (ref 0–10)
ERYTHROCYTE [DISTWIDTH] IN BLOOD BY AUTOMATED COUNT: 14.1 % (ref 10–14.5)
GFR SERPLBLD BASED ON 1.73 SQ M-ARVRAT: > 60 ML/MIN
GLUCOSE SERPL-MCNC: 95 MG/DL (ref 70–105)
HCT VFR BLD CALC: 43 % (ref 40–54)
HDLC SERPL-MCNC: 30 MG/DL (ref 40–60)
HGB BLD-MCNC: 15.4 G/DL (ref 13.3–17.7)
LYMPHOCYTES # BLD AUTO: 3 X 10^3 (ref 1–4)
LYMPHOCYTES NFR BLD AUTO: 43 % (ref 12–44)
MANUAL DIFFERENTIAL PERFORMED BLD QL: NO
MCH RBC QN AUTO: 33 PG (ref 25–34)
MCHC RBC AUTO-ENTMCNC: 36 G/DL (ref 32–36)
MCV RBC AUTO: 91 FL (ref 80–99)
MONOCYTES # BLD AUTO: 0.5 X 10^3 (ref 0–1)
MONOCYTES NFR BLD AUTO: 8 % (ref 0–12)
NEUTROPHILS # BLD AUTO: 2.9 X 10^3 (ref 1.8–7.8)
NEUTROPHILS NFR BLD AUTO: 43 % (ref 42–75)
PLATELET # BLD: 236 10^3/UL (ref 130–400)
PMV BLD AUTO: 10 FL (ref 7.4–10.4)
POTASSIUM SERPL-SCNC: 4 MMOL/L (ref 3.6–5)
RBC # BLD AUTO: 4.74 10^6/UL (ref 4.35–5.85)
SODIUM SERPL-SCNC: 140 MMOL/L (ref 135–145)
TRIGL SERPL-MCNC: 192 MG/DL (ref ?–150)
VLDLC SERPL CALC-MCNC: 38 MG/DL (ref 5–40)
WBC # BLD AUTO: 6.8 10^3/UL (ref 4.3–11)

## 2018-07-20 NOTE — SHORT STAY SUMMARY
History of Present Illness


History of Present Illness


Reason for visit/HPI


49 yo M with recent h/o MI with cardiac arrest that presented with chest pain 

after working on house. States that he took multiple doses of nitro without 

much improvement of chest pain. Denies any  sweating or shortness of breath 

with chest pain. Denies missing any doses of medications. Patient has been 

drinking most of the day and has an elevated blood EtOH level.


Date of Admission


2018 at 10:54 pm


Date of Discharge


2018


Time Seen by Provider:  10:15


Attending Physician


Tushar Alfonso MD


Admitting Physician


[place] [place]


Consult








Allergies and Home Medications


Allergies


Coded Allergies:  


     No Known Drug Allergies (Unverified , 13)





Home Medications


Aspirin 81 Mg Tablet.dr, 81 MG PO DAILY, (Reported)


Atorvastatin Calcium 80 Mg Tablet, 80 MG PO HS, (Reported)


Clopidogrel Bisulfate 75 Mg Tablet, 75 MG PO DAILY, (Reported)


Isosorbide Mononitrate 60 Mg Tab, 60 MG PO DAILY


   Prescribed by: TUSHAR ALFONSO on 18 1022


Lisinopril 20 Mg Tablet, 20 MG PO DAILY, (Reported)


Metoprolol Tartrate 50 Mg Tablet, 50 MG PO BID, (Reported)





Patient Home Medication List


Home Medication List Reviewed:  Yes





Past Medical-Social-Family Hx


Patient Social History


Living Status:  Lives home with wife


Alcohol Use:  Regular Use


Number of Drinks Today:  AA


Alcohol Beverage of Choice:  Beer


Recreational Drug Use:  Yes


Smoking Status:  Current Everyday Smoker


Type Used:  Cigarettes


Physical Abuse Screen:  No


Sexual Abuse:  No


Recent Foreign Travel:  No


Contact w/other who traveled:  No


Recent Hopitalizations:  Yes


Recent Infectious Disease Expo:  No





Immunizations Up To Date


Tetanus Booster (TDap):  Unknown


Pediatric:  No





Seasonal Allergies


Seasonal Allergies:  No





Surgeries


Yes (JAW FX REPAIR X2)


Cardiac, Coronary Stent





Respiratory


No


Currently Using CPAP:  No


Currently Using BIPAP:  No





Cardiovascular


Yes (STENT X 1 )


Coronary Artery Disease, Heart Attack, Heart Murmur, High Cholesterol, 

Hypertension





Neurological


No





Reproductive System


Hx Reproductive Disorders:  No


Sexually Transmitted Disease:  No


HIV/AIDS:  No





Genitourinary


Yes


Kidney Stones





Gastrointestinal


Yes (HX OF BLOOD IN STOOL)


Gastrointestinal Bleed





Musculoskeletal


Yes


Arthritis, Chronic Back Pain





Endocrine


History of Endocrine Disorders:  No





HEENT


Loss of Vision:  Denies


Hearing Impairment:  Denies





Cancer


No





Psychosocial


History of Psychiatric Problem:  Yes (hAS NOT BEEN DX W/ MENTAL DISORDER )


Behavioral Health Disorders:  Anxiety, Depression





Integumentary


History of Skin or Integumenta:  No





Blood Transfusions


History of Blood Disorders:  No


Adverse Reaction to a Blood Tr:  No





Family Medical History


Family Hx:  


Unknown





Constitutional:  no symptoms reported; No chills, No fever, No malaise, No 

weakness


EENTM:  no symptoms reported


Respiratory:  no symptoms reported; No cough, No dyspnea on exertion, No short 

of breath


Cardiovascular:  chest pain; No edema, No palpitations


Gastrointestinal:  no symptoms reported; No abdominal pain, No constipation, No 

diarrhea, No nausea, No vomiting


Genitourinary:  no symptoms reported; No dysuria, No frequency, No hematuria


Musculoskeletal:  back pain; No joint pain, No muscle pain


Skin:  no symptoms reported; No lesions, No rash


Psychiatric/Neurological:  No Symptoms Reported





Physical Exam


Vital Signs





Vital Signs - First Documented




















Capillary Refill : Less Than 3 Seconds


Height, Weight, BMI


Height: 5'8.00"


Weight: 156lbs. 5.0oz. 70.020877ld; 23.8 BMI


Method:Estimated


General Appearance:  No Apparent Distress, WD/WN


HEENT:  PERRL/EOMI


Neck:  Full Range of Motion, Non Tender, Supple


Respiratory:  Chest Non Tender, Lungs Clear, Normal Breath Sounds, No Accessory 

Muscle Use, No Respiratory Distress


Cardiovascular:  Regular Rate, Rhythm, No Edema, No Murmur


Gastrointestinal:  Normal Bowel Sounds, No Organomegaly, Non Tender, Soft


Back:  No CVA Tenderness


Extremity:  Normal Capillary Refill, No Calf Tenderness, No Pedal Edema


Neurologic/Psychiatric:  Alert, Oriented x3, CNs II-XII Norm as Tested


Skin:  Normal Color, Warm/Dry


Lymphatic:  No Adenopathy





Clinical Quality Measures


AMI/AHF:


ASA po Prior to arrival:  No





DVT/VTE Risk/Contraindication:


Risk Factor Score Per Nursin


RFS Level Per Nursing on Admit:  2=Moderate





Short Stay Diagnosis


Discharge Diagnosis-Short Stay


Admission Diagnosis:  


Chest pain


CAD


HTN


HLD


EtOH Intoxication


MJ Abuse


Final Discharge Diagnosis:  


See Above





Conclusion


Labs


Laboratory Tests


18 21:50: 


White Blood Count 10.3, Red Blood Count 4.73, Hemoglobin 14.9, Hematocrit 43, 

Mean Corpuscular Volume 91, Mean Corpuscular Hemoglobin 32, Mean Corpuscular 

Hemoglobin Concent 35, Red Cell Distribution Width 14.0, Platelet Count 270, 

Mean Platelet Volume 10.2, Neutrophils (%) (Auto) 55, Lymphocytes (%) (Auto) 31

, Monocytes (%) (Auto) 9, Eosinophils (%) (Auto) 4, Basophils (%) (Auto) 1, 

Neutrophils # (Auto) 5.7, Lymphocytes # (Auto) 3.2, Monocytes # (Auto) 1.0, 

Eosinophils # (Auto) 0.4H, Basophils # (Auto) 0.1, Prothrombin Time 13.8, INR 

Comment 1.1, Activated Partial Thromboplast Time 30, D-Dimer 0.36, Sodium Level 

139, Potassium Level 3.7, Chloride Level 108H, Carbon Dioxide Level 19L, Anion 

Gap 12, Blood Urea Nitrogen 7, Creatinine 1.05, Estimat Glomerular Filtration 

Rate > 60, BUN/Creatinine Ratio 7, Glucose Level 107H, Calcium Level 9.2, 

Magnesium Level 2.1, Total Bilirubin 0.6, Aspartate Amino Transf (AST/SGOT) 17, 

Alanine Aminotransferase (ALT/SGPT) 11, Alkaline Phosphatase 104, Myoglobin 29.4

, Troponin I < 0.30, B-Type Natriuretic Peptide 18.8, Total Protein 7.5, 

Albumin 4.2, Lipase 42, Salicylates Level < 5.0L, Acetaminophen Level < 10L, 

Serum Alcohol 225H


18 22:10: 


Urine Color YELLOW, Urine Clarity CLEAR, Urine pH 5, Urine Specific Gravity 

1.010L, Urine Protein NEGATIVE, Urine Glucose (UA) NEGATIVE, Urine Ketones 

NEGATIVE, Urine Nitrite NEGATIVE, Urine Bilirubin NEGATIVE, Urine Urobilinogen 

NORMAL, Urine Leukocyte Esterase NEGATIVE, Urine RBC (Auto) NEGATIVE, Urine RBC 

NONE, Urine WBC RARE, Urine Squamous Epithelial Cells NONE, Urine Crystals NONE

, Urine Bacteria TRACE, Urine Casts NONE, Urine Mucus NEGATIVE, Urine Culture 

Indicated NO, Urine Opiates Screen NEGATIVE, Urine Oxycodone Screen NEGATIVE, 

Urine Methadone Screen NEGATIVE, Urine Propoxyphene Screen NEGATIVE, Urine 

Barbiturates Screen NEGATIVE, Ur Tricyclic Antidepressants Screen NEGATIVE, 

Urine Phencyclidine Screen NEGATIVE, Urine Amphetamines Screen NEGATIVE, Urine 

Methamphetamines Screen NEGATIVE, Urine Benzodiazepines Screen NEGATIVE, Urine 

Cocaine Screen NEGATIVE, Urine Cannabinoids Screen POSITIVEH


18 03:30: 


White Blood Count 6.8, Red Blood Count 4.74, Hemoglobin 15.4, Hematocrit 43, 

Mean Corpuscular Volume 91, Mean Corpuscular Hemoglobin 33, Mean Corpuscular 

Hemoglobin Concent 36, Red Cell Distribution Width 14.1, Platelet Count 236, 

Mean Platelet Volume 10.0, Neutrophils (%) (Auto) 43, Lymphocytes (%) (Auto) 43

, Monocytes (%) (Auto) 8, Eosinophils (%) (Auto) 6, Basophils (%) (Auto) 1, 

Neutrophils # (Auto) 2.9, Lymphocytes # (Auto) 3.0, Monocytes # (Auto) 0.5, 

Eosinophils # (Auto) 0.4H, Basophils # (Auto) 0.1, Sodium Level 140, Potassium 

Level 4.0, Chloride Level 109H, Carbon Dioxide Level 19L, Anion Gap 12, Blood 

Urea Nitrogen 7, Creatinine 0.93, Estimat Glomerular Filtration Rate > 60, BUN/

Creatinine Ratio 8, Glucose Level 95, Calcium Level 9.1, Troponin I < 0.30, 

Triglycerides Level 192H, Cholesterol Level 230H, LDL Cholesterol Direct 176H, 

VLDL Cholesterol 38, HDL Cholesterol 30L


Conclusion/Plan


49 yo M with known CAD that presented with chest pain





Typical Chest pain: Cardiology was consulted. Serial troponin normal. Patient 

had recent stress testing. Maximize medical management. Patient was discharged 

home with close follow up with cardiology.





CAD: See Above





HTN: Controlled, continue home meds





HLD: Continue statin





EtOH Intoxication: discussed the importance of cessation





MJ Abuse





Copy


Copies To 1:   TUSHAR MAGANA MD 2018 10:21

## 2018-07-20 NOTE — DISCHARGE INSTRUCTIONS
Discharge Inst-[facility]


Discharge Medications


New, Converted or Re-Newed RX:  Transmitted to Pharmacy ([facility])


New Medications:  


Isosorbide Mononitrate (Isosorbide Mononitrate ER) 60 Mg Tab


60 MG PO DAILY, #30 TAB





 


Continued Medications:  


Aspirin (Aspirin EC) 81 Mg Tablet.dr


81 MG PO DAILY, TAB





Atorvastatin Calcium (Atorvastatin Calcium) 80 Mg Tablet


80 MG PO HS, TAB





Clopidogrel Bisulfate (Plavix) 75 Mg Tablet


75 MG PO DAILY, TAB





Lisinopril (Lisinopril) 20 Mg Tablet


20 MG PO DAILY, TAB





Metoprolol Tartrate (Metoprolol Tartrate) 50 Mg Tablet


50 MG PO BID, TAB











Patient Instructions


Goal/Follow Up Appt:  


July 24th @ 120 PM with Dr Lomeli for hospital followup


Patient Instructions:  


- Make sure to review you medication list as a medication has been added


Return to The Hospital For:  


- Chest pain 


- Shortness of breath





Activity & Diet


Discharge Diet:  Cardiac Diet


Activity as Tolerated:  Yes





Orders-Post D/C & Referrals


Pneu Vac Indicated:  Yes





Copy


Copies To 1:   MARILU LOMELI MD, HOLLY R MD Jul 20, 2018 10:37 am

## 2018-07-20 NOTE — CONSULTATION-CARDIOLOGY
HPI-Cardiology


Cardiology Consultation:


Date of Consultation


18


Date of Admission





Attending Physician


Tushar Alfonso MD


Admitting Physician


[place]


Consulting Physician


LANDON GRIJALVA MD





HPI:


Time Seen by Provider:  09:30


Chief Complaint:


Chest pain


This is a 48-year-old gentleman who has had previous history of an MI with PCI 

in 2018.  He presented again with chest pain and coronary angiography was 

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  

No PCI could be done since it was a very small vessel.  Cardiac surgery 

consultation was done at  who also were of the opinion that there is no 

surgical option.  The patient continues on dual antiplatelet therapy.  Patient 

presented with chest pain.  He denies any shortness of breath.





Review of Systems-Cardiology


Review of Systems


Constitutional:  As described under HPI; No As described under HPI, No no 

symptoms reported, No chills, No fever, No lightheadedness


Eyes:  No As described under HPI, No no symptoms reported, No blindness, No 

blurred vision, No contact lenses, No drainage, No decreased acuity, No foreign 

body sensation, No pain, No vision change


Ears/Nose/Throat:  No As described under HPI, No no symptoms reported, No 

chronic hearing loss, No ear discharge, No ear pain, No nasal drainage, No 

ulcerations


Respiratory:  No no symptoms reported; As described under HPI; No As described 

under HPI, No cough, No orthopnea, No shortness of breath, No SOB with excertion


Cardiovascular:  No no symptoms reported; As described under HPI; No As 

described under HPI, No chest pain, No edema, No irregular heart rate, No 

lightheadedness, No palpitations


Gastrointestinal:  No no symptoms reported, No As described under HPI, No 

abdomen distended, No abdominal pain, No blood streaked bowels, No constipation

, No diarrhea, No nausea, No vomiting, No stool coloration changes


Genitourinary:  No As described under HPI, No burning, No dysuria, No discharge

, No frequency, No flank pain, No hematuria, No urgency


Skin:  No rash, No skin related problems, No ulcerations


Psychiatric/Neurological:  No anxiety, No depression, No seizure, No focal 

weakness, No syncope


Hematologic:  No bleeding abnormalities





PMH-Social-Family Hx


Patient Social History


Alcohol Use:  Regular Use


Recreational Drug Use:  Yes


Smoking Status:  Current Everyday Smoker


Type Used:  Cigarettes


Recent Foreign Travel:  No


Recent Infectious Disease Expo:  No


Hospitalization with Isolation:  Denies


Physical Abuse Screen:  No


Sexual Abuse:  No





Immunizations Up To Date


Tetanus Booster (TDap):  Unknown





Past Medical History


PMH


As described under Assessment.





Family Medical History


Family History:  


Unknown





Allergies and Home Medications


Allergies


Coded Allergies:  


     No Known Drug Allergies (Unverified , 13)





Home Medications


Aspirin 81 Mg Tablet.dr, 81 MG PO DAILY, (Reported)


Atorvastatin Calcium 80 Mg Tablet, 80 MG PO HS, (Reported)


Clopidogrel Bisulfate 75 Mg Tablet, 75 MG PO DAILY, (Reported)


Isosorbide Mononitrate 60 Mg Tab, 60 MG PO DAILY


   Prescribed by: TUSHAR ALFONSO on 18 1022


Lisinopril 20 Mg Tablet, 20 MG PO DAILY, (Reported)


Metoprolol Tartrate 50 Mg Tablet, 50 MG PO BID, (Reported)





Patient Home Medication List


Home Medication List Reviewed:  Yes





Physical Exam-Cardiology


Physical Exam


Vital Signs/I&O











 18





 06:00 07:00 07:00 08:00


 


Temp  98.4  


 


Pulse 83 81 78 


 


Resp 23 16  


 


B/P (MAP) 103/67 (79) 103/66 (78)  


 


Pulse Ox 95 98  95


 


O2 Delivery Room Air Room Air  Room Air


 


    





 18 





 08:00 09:00 11:00 


 


Temp  98.9  


 


Pulse 84 71 76 


 


Resp  12 16 


 


B/P (MAP)  115/89 (98)  


 


Pulse Ox  97 95 


 


O2 Delivery Room Air Room Air Room Air 





Capillary Refill : Less Than 3 Seconds


Constitutional:  appears stated age, AAO x 3; No apparent distress; well-

developed, well-nourished


HEENT:  PERRL; No normal ENT inspection, No TMs normal, No pharynx normal, No 

scleral icterus (R), No scleral icterus (L), No pale conjunctivae (R), No pale 

conjunctivae (L), No photophobia, No TM abnormal (R), No TM abnormal (L), No 

pharyngeal erythema, No tonsillar exudate, No other, No discharge, No EOMI; 

hearing is well preserved; No hard of hearing; oral hygience is good; No 

ulceration, No xanthelasmas are seen


Neck:  No non-tender, No full range of motion, No supple, No normal inspection, 

No carotid bruit, No limited range of motion, No lymphadenopathy (R), No 

lymphadenopathy (L), No tender lateral, No tender midline, No thyromegaly, No 

other; carotid pulses are 2 + bilaterally; No with good upstrokes


Respiratory:  No accessory muscle use, No respiratory distress, No chest tender

, No chest expansion is symmetric; chest is bilaterally symmetric; No lungs 

clear to percussion; lungs clear to auscultation; No crackles, No rhonchi, No 

rales, No stridor, No wheezing, No pleural rub, No other


Cardiovascular:  regular rate-rhythm, S1 and S2


Gastrointestinal:  No tender, No soft, No round, No distended, No pulsatile mass

, No organomegaly, No guarding, No rebound, No tenderness, No hernia, No mass, 

No audible bowel sounds, No abnormal bowel sounds, No abdominal bruits, No 

spleenomegaly, No other


Rectal:  deferred


Extremities:  No normal range of motion, No non-tender, No normal inspection, 

No pedal edema, No calf tenderness, No normal capillary refill, No pelvis stable

, No calf tenderness, No inflammation, No pedal edema, No slow capillary refill

, No swelling, No other, No abrasion, No clubbing, No cyanosis, No ecchymosis, 

No laceration, No no lower extremity edema bilateral, No significant edema, No 

tenderness, No wound


Neurologic/Psychiatric:  no motor/sensory deficits, alert, normal mood/affect, 

oriented x 3, power is 5/5 both on sides


Skin:  No normal color, No warm/dry, No cyanosis, No cool, No diaphoresis, No 

damp, No ecchymosis, No jaundice, No mottled, No pallor, No rash, No tattoos/

piercings, No ulcerations, No rash on exposed areas, No ulcerations on exposed 

areas, No other





Data Review


Labs


Laboratory Tests


18 21:50: 


White Blood Count 10.3, Red Blood Count 4.73, Hemoglobin 14.9, Hematocrit 43, 

Mean Corpuscular Volume 91, Mean Corpuscular Hemoglobin 32, Mean Corpuscular 

Hemoglobin Concent 35, Red Cell Distribution Width 14.0, Platelet Count 270, 

Mean Platelet Volume 10.2, Neutrophils (%) (Auto) 55, Lymphocytes (%) (Auto) 31

, Monocytes (%) (Auto) 9, Eosinophils (%) (Auto) 4, Basophils (%) (Auto) 1, 

Neutrophils # (Auto) 5.7, Lymphocytes # (Auto) 3.2, Monocytes # (Auto) 1.0, 

Eosinophils # (Auto) 0.4H, Basophils # (Auto) 0.1, Prothrombin Time 13.8, INR 

Comment 1.1, Activated Partial Thromboplast Time 30, D-Dimer 0.36, Sodium Level 

139, Potassium Level 3.7, Chloride Level 108H, Carbon Dioxide Level 19L, Anion 

Gap 12, Blood Urea Nitrogen 7, Creatinine 1.05, Estimat Glomerular Filtration 

Rate > 60, BUN/Creatinine Ratio 7, Glucose Level 107H, Calcium Level 9.2, 

Magnesium Level 2.1, Total Bilirubin 0.6, Aspartate Amino Transf (AST/SGOT) 17, 

Alanine Aminotransferase (ALT/SGPT) 11, Alkaline Phosphatase 104, Myoglobin 29.4

, Troponin I < 0.30, B-Type Natriuretic Peptide 18.8, Total Protein 7.5, 

Albumin 4.2, Lipase 42, Salicylates Level < 5.0L, Acetaminophen Level < 10L, 

Serum Alcohol 225H


18 22:10: 


Urine Color YELLOW, Urine Clarity CLEAR, Urine pH 5, Urine Specific Gravity 

1.010L, Urine Protein NEGATIVE, Urine Glucose (UA) NEGATIVE, Urine Ketones 

NEGATIVE, Urine Nitrite NEGATIVE, Urine Bilirubin NEGATIVE, Urine Urobilinogen 

NORMAL, Urine Leukocyte Esterase NEGATIVE, Urine RBC (Auto) NEGATIVE, Urine RBC 

NONE, Urine WBC RARE, Urine Squamous Epithelial Cells NONE, Urine Crystals NONE

, Urine Bacteria TRACE, Urine Casts NONE, Urine Mucus NEGATIVE, Urine Culture 

Indicated NO, Urine Opiates Screen NEGATIVE, Urine Oxycodone Screen NEGATIVE, 

Urine Methadone Screen NEGATIVE, Urine Propoxyphene Screen NEGATIVE, Urine 

Barbiturates Screen NEGATIVE, Ur Tricyclic Antidepressants Screen NEGATIVE, 

Urine Phencyclidine Screen NEGATIVE, Urine Amphetamines Screen NEGATIVE, Urine 

Methamphetamines Screen NEGATIVE, Urine Benzodiazepines Screen NEGATIVE, Urine 

Cocaine Screen NEGATIVE, Urine Cannabinoids Screen POSITIVEH


18 03:30: 


White Blood Count 6.8, Red Blood Count 4.74, Hemoglobin 15.4, Hematocrit 43, 

Mean Corpuscular Volume 91, Mean Corpuscular Hemoglobin 33, Mean Corpuscular 

Hemoglobin Concent 36, Red Cell Distribution Width 14.1, Platelet Count 236, 

Mean Platelet Volume 10.0, Neutrophils (%) (Auto) 43, Lymphocytes (%) (Auto) 43

, Monocytes (%) (Auto) 8, Eosinophils (%) (Auto) 6, Basophils (%) (Auto) 1, 

Neutrophils # (Auto) 2.9, Lymphocytes # (Auto) 3.0, Monocytes # (Auto) 0.5, 

Eosinophils # (Auto) 0.4H, Basophils # (Auto) 0.1, Sodium Level 140, Potassium 

Level 4.0, Chloride Level 109H, Carbon Dioxide Level 19L, Anion Gap 12, Blood 

Urea Nitrogen 7, Creatinine 0.93, Estimat Glomerular Filtration Rate > 60, BUN/

Creatinine Ratio 8, Glucose Level 95, Calcium Level 9.1, Troponin I < 0.30, 

Triglycerides Level 192H, Cholesterol Level 230H, LDL Cholesterol Direct 176H, 

VLDL Cholesterol 38, HDL Cholesterol 30L








ECG Impression


ECG


Initial ECG Rhythm:  Normal Sinus


Initial ECG Impression:  Normal





A/P-Cardiology


Assessment/Admission Diagnosis


Chest pain,


CAD,


Anomalous RCA,


Hypertension,


Hyperlipidemia


Marijuana use,


Alcohol intoxication





Plan


Chest pain: Acute coronary syndrome was ruled out with serial negative troponin 

and negative EKG.  And will follow-up as an outpatient.





CAD: Patient will continue dual antiplatelet therapy for at least 1 year.  He 

will also continue beta blocker, lisinopril and statin therapy.





Hypertension: Continue beta blocker and lisinopril.





Hyperlipidemia: Continue statin therapy.





Alcohol intoxication: Defer to the primary team.





Marijuana abuse: If defer to the primary team.








Thank you for your consultation. Please call me if you have any questions.








ORLY Grijalva MD, FACP, FACC, FSCAI, FHRS, CCDS


Interventional Cardiology


Cardiac Electrophysiology


Vascular Medicine and Endovascular Interventions





Clinical Quality Measures


AMI/AHF:


ASA po Prior to arrival:  No





DVT/VTE Risk/Contraindication:


Risk Factor Score Per Nursin


RFS Level Per Nursing on Admit:  2=Moderate











LANDON GRIJALVA MD 2018 10:27

## 2018-11-12 ENCOUNTER — HOSPITAL ENCOUNTER (INPATIENT)
Dept: HOSPITAL 75 - ER | Age: 48
LOS: 1 days | Discharge: HOME | DRG: 872 | End: 2018-11-13
Attending: FAMILY MEDICINE | Admitting: FAMILY MEDICINE
Payer: COMMERCIAL

## 2018-11-12 VITALS — BODY MASS INDEX: 27.15 KG/M2 | HEIGHT: 67 IN | WEIGHT: 173 LBS

## 2018-11-12 VITALS — DIASTOLIC BLOOD PRESSURE: 87 MMHG | SYSTOLIC BLOOD PRESSURE: 146 MMHG

## 2018-11-12 VITALS — SYSTOLIC BLOOD PRESSURE: 139 MMHG | DIASTOLIC BLOOD PRESSURE: 89 MMHG

## 2018-11-12 VITALS — SYSTOLIC BLOOD PRESSURE: 157 MMHG | DIASTOLIC BLOOD PRESSURE: 97 MMHG

## 2018-11-12 VITALS — SYSTOLIC BLOOD PRESSURE: 144 MMHG | DIASTOLIC BLOOD PRESSURE: 92 MMHG

## 2018-11-12 VITALS — SYSTOLIC BLOOD PRESSURE: 138 MMHG | DIASTOLIC BLOOD PRESSURE: 83 MMHG

## 2018-11-12 VITALS — DIASTOLIC BLOOD PRESSURE: 95 MMHG | SYSTOLIC BLOOD PRESSURE: 158 MMHG

## 2018-11-12 DIAGNOSIS — F14.90: ICD-10-CM

## 2018-11-12 DIAGNOSIS — F15.90: ICD-10-CM

## 2018-11-12 DIAGNOSIS — E78.00: ICD-10-CM

## 2018-11-12 DIAGNOSIS — I25.2: ICD-10-CM

## 2018-11-12 DIAGNOSIS — F17.210: ICD-10-CM

## 2018-11-12 DIAGNOSIS — Z95.5: ICD-10-CM

## 2018-11-12 DIAGNOSIS — A41.9: Primary | ICD-10-CM
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
## 2018-11-12 DIAGNOSIS — F10.20: ICD-10-CM

## 2018-11-12 DIAGNOSIS — E78.5: ICD-10-CM

## 2018-11-12 DIAGNOSIS — F11.90: ICD-10-CM

## 2018-11-12 DIAGNOSIS — L03.211: ICD-10-CM

## 2018-11-12 DIAGNOSIS — Z91.19: ICD-10-CM

## 2018-11-12 DIAGNOSIS — I25.118: ICD-10-CM

## 2018-11-12 DIAGNOSIS — F12.90: ICD-10-CM

## 2018-11-12 DIAGNOSIS — F29: ICD-10-CM

## 2018-11-12 DIAGNOSIS — K04.7: ICD-10-CM

## 2018-11-12 DIAGNOSIS — I10: ICD-10-CM

## 2018-11-12 LAB
ALBUMIN SERPL-MCNC: 4.1 GM/DL (ref 3.2–4.5)
ALBUMIN SERPL-MCNC: 4.1 GM/DL (ref 3.2–4.5)
ALP SERPL-CCNC: 110 U/L (ref 40–136)
ALP SERPL-CCNC: 110 U/L (ref 40–136)
ALT SERPL-CCNC: 16 U/L (ref 0–55)
ALT SERPL-CCNC: 17 U/L (ref 0–55)
BARBITURATES UR QL: NEGATIVE
BASOPHILS # BLD AUTO: 0 10^3/UL (ref 0–0.1)
BASOPHILS # BLD AUTO: 0 10^3/UL (ref 0–0.1)
BASOPHILS NFR BLD AUTO: 0 % (ref 0–10)
BASOPHILS NFR BLD AUTO: 0 % (ref 0–10)
BENZODIAZ UR QL SCN: NEGATIVE
BILIRUB SERPL-MCNC: 0.4 MG/DL (ref 0.1–1)
BILIRUB SERPL-MCNC: 0.5 MG/DL (ref 0.1–1)
BUN/CREAT SERPL: 6
BUN/CREAT SERPL: 7
CALCIUM SERPL-MCNC: 9.4 MG/DL (ref 8.5–10.1)
CALCIUM SERPL-MCNC: 9.7 MG/DL (ref 8.5–10.1)
CHLORIDE SERPL-SCNC: 105 MMOL/L (ref 98–107)
CHLORIDE SERPL-SCNC: 108 MMOL/L (ref 98–107)
CO2 SERPL-SCNC: 18 MMOL/L (ref 21–32)
CO2 SERPL-SCNC: 20 MMOL/L (ref 21–32)
COCAINE UR QL: NEGATIVE
CREAT SERPL-MCNC: 0.8 MG/DL (ref 0.6–1.3)
CREAT SERPL-MCNC: 0.85 MG/DL (ref 0.6–1.3)
EOSINOPHIL # BLD AUTO: 0.4 10^3/UL (ref 0–0.3)
EOSINOPHIL # BLD AUTO: 0.5 10^3/UL (ref 0–0.3)
EOSINOPHIL NFR BLD AUTO: 3 % (ref 0–10)
EOSINOPHIL NFR BLD AUTO: 3 % (ref 0–10)
EOSINOPHIL NFR BLD MANUAL: 4 %
ERYTHROCYTE [DISTWIDTH] IN BLOOD BY AUTOMATED COUNT: 13.3 % (ref 10–14.5)
ERYTHROCYTE [DISTWIDTH] IN BLOOD BY AUTOMATED COUNT: 13.3 % (ref 10–14.5)
GFR SERPLBLD BASED ON 1.73 SQ M-ARVRAT: > 60 ML/MIN
GFR SERPLBLD BASED ON 1.73 SQ M-ARVRAT: > 60 ML/MIN
GLUCOSE SERPL-MCNC: 113 MG/DL (ref 70–105)
GLUCOSE SERPL-MCNC: 125 MG/DL (ref 70–105)
HCT VFR BLD CALC: 44 % (ref 40–54)
HCT VFR BLD CALC: 44 % (ref 40–54)
HGB BLD-MCNC: 15.1 G/DL (ref 13.3–17.7)
HGB BLD-MCNC: 15.2 G/DL (ref 13.3–17.7)
LYMPHOCYTES # BLD AUTO: 1.5 X 10^3 (ref 1–4)
LYMPHOCYTES # BLD AUTO: 1.9 X 10^3 (ref 1–4)
LYMPHOCYTES NFR BLD AUTO: 12 % (ref 12–44)
LYMPHOCYTES NFR BLD AUTO: 12 % (ref 12–44)
MANUAL DIFFERENTIAL PERFORMED BLD QL: NO
MANUAL DIFFERENTIAL PERFORMED BLD QL: YES
MCH RBC QN AUTO: 33 PG (ref 25–34)
MCH RBC QN AUTO: 33 PG (ref 25–34)
MCHC RBC AUTO-ENTMCNC: 35 G/DL (ref 32–36)
MCHC RBC AUTO-ENTMCNC: 35 G/DL (ref 32–36)
MCV RBC AUTO: 94 FL (ref 80–99)
MCV RBC AUTO: 95 FL (ref 80–99)
METHADONE UR QL SCN: NEGATIVE
METHAMPHETAMINE SCREEN URINE S: NEGATIVE
MONOCYTES # BLD AUTO: 1.3 X 10^3 (ref 0–1)
MONOCYTES # BLD AUTO: 1.6 X 10^3 (ref 0–1)
MONOCYTES NFR BLD AUTO: 10 % (ref 0–12)
MONOCYTES NFR BLD AUTO: 9 % (ref 0–12)
MONOCYTES NFR BLD: 9 %
NEUTROPHILS # BLD AUTO: 10.2 X 10^3 (ref 1.8–7.8)
NEUTROPHILS # BLD AUTO: 12.1 X 10^3 (ref 1.8–7.8)
NEUTROPHILS NFR BLD AUTO: 75 % (ref 42–75)
NEUTROPHILS NFR BLD AUTO: 76 % (ref 42–75)
NEUTS BAND NFR BLD MANUAL: 72 %
OPIATES UR QL SCN: NEGATIVE
OXYCODONE UR QL: NEGATIVE
PLATELET # BLD: 224 10^3/UL (ref 130–400)
PLATELET # BLD: 247 10^3/UL (ref 130–400)
PMV BLD AUTO: 10.1 FL (ref 7.4–10.4)
PMV BLD AUTO: 9.8 FL (ref 7.4–10.4)
POTASSIUM SERPL-SCNC: 3.5 MMOL/L (ref 3.6–5)
POTASSIUM SERPL-SCNC: 4.2 MMOL/L (ref 3.6–5)
PROPOXYPH UR QL: NEGATIVE
PROT SERPL-MCNC: 7.5 GM/DL (ref 6.4–8.2)
PROT SERPL-MCNC: 7.6 GM/DL (ref 6.4–8.2)
RBC # BLD AUTO: 4.61 10^6/UL (ref 4.35–5.85)
RBC # BLD AUTO: 4.62 10^6/UL (ref 4.35–5.85)
SODIUM SERPL-SCNC: 136 MMOL/L (ref 135–145)
SODIUM SERPL-SCNC: 140 MMOL/L (ref 135–145)
STOMATOCYTES BLD QL SMEAR: (no result)
TRICYCLICS UR QL SCN: NEGATIVE
VARIANT LYMPHS NFR BLD MANUAL: 15 %
WBC # BLD AUTO: 13.4 10^3/UL (ref 4.3–11)
WBC # BLD AUTO: 16.2 10^3/UL (ref 4.3–11)

## 2018-11-12 PROCEDURE — 70487 CT MAXILLOFACIAL W/DYE: CPT

## 2018-11-12 PROCEDURE — 96365 THER/PROPH/DIAG IV INF INIT: CPT

## 2018-11-12 PROCEDURE — 36415 COLL VENOUS BLD VENIPUNCTURE: CPT

## 2018-11-12 PROCEDURE — 80053 COMPREHEN METABOLIC PANEL: CPT

## 2018-11-12 PROCEDURE — 85027 COMPLETE CBC AUTOMATED: CPT

## 2018-11-12 PROCEDURE — 96375 TX/PRO/DX INJ NEW DRUG ADDON: CPT

## 2018-11-12 PROCEDURE — 87040 BLOOD CULTURE FOR BACTERIA: CPT

## 2018-11-12 PROCEDURE — 80320 DRUG SCREEN QUANTALCOHOLS: CPT

## 2018-11-12 PROCEDURE — 83605 ASSAY OF LACTIC ACID: CPT

## 2018-11-12 PROCEDURE — 70355 PANORAMIC X-RAY OF JAWS: CPT

## 2018-11-12 PROCEDURE — 80306 DRUG TEST PRSMV INSTRMNT: CPT

## 2018-11-12 PROCEDURE — 85025 COMPLETE CBC W/AUTO DIFF WBC: CPT

## 2018-11-12 PROCEDURE — 96361 HYDRATE IV INFUSION ADD-ON: CPT

## 2018-11-12 PROCEDURE — 85007 BL SMEAR W/DIFF WBC COUNT: CPT

## 2018-11-12 RX ADMIN — ACETAMINOPHEN PRN MG: 500 TABLET ORAL at 22:55

## 2018-11-12 RX ADMIN — ASCORBIC ACID, VITAMIN A PALMITATE, CHOLECALCIFEROL, THIAMINE HYDROCHLORIDE, RIBOFLAVIN-5 PHOSPHATE SODIUM, PYRIDOXINE HYDROCHLORIDE, NIACINAMIDE, DEXPANTHENOL, ALPHA-TOCOPHEROL ACETATE, VITAMIN K1, FOLIC ACID, BIOTIN, CYANOCOBALAMIN SCH MLS/HR: 200; 3300; 200; 6; 3.6; 6; 40; 15; 10; 150; 600; 60; 5 INJECTION, SOLUTION INTRAVENOUS at 13:31

## 2018-11-12 RX ADMIN — NICOTINE SCH MG: 21 PATCH, EXTENDED RELEASE TRANSDERMAL at 03:51

## 2018-11-12 RX ADMIN — KETOROLAC TROMETHAMINE PRN MG: 30 INJECTION, SOLUTION INTRAMUSCULAR; INTRAVENOUS at 18:51

## 2018-11-12 RX ADMIN — AMPICILLIN SODIUM AND SULBACTAM SODIUM SCH MLS/HR: 2; 1 INJECTION, POWDER, FOR SOLUTION INTRAMUSCULAR; INTRAVENOUS at 12:47

## 2018-11-12 RX ADMIN — ACETAMINOPHEN PRN MG: 500 TABLET ORAL at 05:50

## 2018-11-12 RX ADMIN — KETOROLAC TROMETHAMINE PRN MG: 30 INJECTION, SOLUTION INTRAMUSCULAR; INTRAVENOUS at 12:47

## 2018-11-12 RX ADMIN — SODIUM CHLORIDE, SODIUM LACTATE, POTASSIUM CHLORIDE, AND CALCIUM CHLORIDE SCH MLS/HR: 600; 310; 30; 20 INJECTION, SOLUTION INTRAVENOUS at 10:30

## 2018-11-12 RX ADMIN — ACETAMINOPHEN PRN MG: 500 TABLET ORAL at 12:46

## 2018-11-12 RX ADMIN — SODIUM CHLORIDE, SODIUM LACTATE, POTASSIUM CHLORIDE, AND CALCIUM CHLORIDE SCH MLS/HR: 600; 310; 30; 20 INJECTION, SOLUTION INTRAVENOUS at 03:52

## 2018-11-12 RX ADMIN — AMPICILLIN SODIUM AND SULBACTAM SODIUM SCH MLS/HR: 2; 1 INJECTION, POWDER, FOR SOLUTION INTRAMUSCULAR; INTRAVENOUS at 18:13

## 2018-11-12 RX ADMIN — METOPROLOL TARTRATE SCH MG: 50 TABLET, FILM COATED ORAL at 20:51

## 2018-11-12 RX ADMIN — SODIUM CHLORIDE, SODIUM LACTATE, POTASSIUM CHLORIDE, AND CALCIUM CHLORIDE SCH MLS/HR: 600; 310; 30; 20 INJECTION, SOLUTION INTRAVENOUS at 17:10

## 2018-11-12 NOTE — DIAGNOSTIC IMAGING REPORT
INDICATION: 

Dental abscess.



EXAMINATION:

Panorex of the mandible.



FINDINGS:

A Panorex of the mandible shows several teeth have been extracted

previously. Only two incisors remain in the left mandible. There

is no evidence of a periapical abscess.



IMPRESSION: 

Multiple teeth extractions. No evidence for dental abscess.



Dictated by: 



  Dictated on workstation # [id number]

## 2018-11-12 NOTE — DIAGNOSTIC IMAGING REPORT
PROCEDURE: CT maxillofacial with contrast.



TECHNIQUE: After intravenous administration of contrast, axial

images were obtained through the face and reformatted into

coronal and sagittal planes. 



INDICATION:  Left-sided periodontal pain.



There is a large 13 mm x 10 mm periapical bony lucency associated

with the root of tooth  #11 and the left maxilla with anterior

cortical disruption with adjacent overlying soft tissue swelling,

subperiosteal fluid and subperiosteal gas. Subperiosteal

gas/fluid collection is presumed abscess and measures an AP

thickness or depth of 5 mm. Overlying cellulitis and subcutaneous

edema is present and there is some mild left-sided reactive

lymphadenopathy. Similar periapical lucencies without

full-thickness cortical disruption of the adjacent left maxillary

teeth present. There is slight membrane thickening in the left

greater than right maxillary sinus, no paranasal sinus air-fluid

level. The mastoids and middle ear cavities are clear. The

nasopharynx and the hypopharynx as well as visualized larynx

unremarkable.  The prevertebral and retropharyngeal spaces

unremarkable. 



IMPRESSION:  Left periapical and subperiosteal abscess associated

with the left maxillary bicuspid tooth #11. Some chronic

paranasal sinus membrane thickening without air-fluid level. No

other acute finding.



I agree with the preliminary.



Dictated by: 



  Dictated on workstation # [id number]

## 2018-11-12 NOTE — HISTORY & PHYSICIAL ([facility])
HPI


History of Present Illness:


47 yo male came to ER due to severe facial swelling associated with tooth 

problem. He states that he started having pain and swelling on Friday evening 

about 2 days ago, which progressively got worse. He has had some teeth pulled 

in the area, but apparently was to have more done and was not happy with the 

dentist so had not been back.


Date seen by provider:  Nov 12, 2018


Time Seen by Provider:  10:15


Attending Physician


Marilu Lomeli MD


[facility]


Consult





Date of Admission


Nov 12, 2018 at 01:55





Home Medications


Home Medications


Reviewed patient Home Medication Reconciliation performed by pharmacy 

medication reconciliations technician and/or nursing.


Patients Allergies have been reviewed.





Allergies


Coded Allergies:  


     No Known Drug Allergies (Unverified , 1/4/13)





PMH-Social-Family Hx


Patient Social History


Alcohol Use:  Regular Use


Recreational Drug Use:  Yes (THC, METH, PILLS--PAIN PILLS MOSTLY. DENIES IV USE)


Drug of Choice:  THC, METH, RX PILLS--MOSTLY PAIN PILLS. DENIES IV USE


Smoking Status:  Current Everyday Smoker


Type Used:  Cigarettes


Recent Foreign Travel:  No


Contact w/other who traveled:  No


Recent Hopitalizations:  No


Recent Infectious Disease Expo:  No


Physical Abuse Screen:  No


Sexual Abuse:  No





Immunizations Up To Date


Tetanus Booster (TDap):  Unknown





Past Medical History





Past medical history


1.  Coronary artery disease


2.  Tobaccoism


3.  Substance abuse


4.  Renal calculi


5.  Alcoholism


Past surgical history


1.  Repair of jaw fracture


2.  Coronary artery stent





Family Medical History


Family History:  


Unknown





Review of Systems ([facility])


Constitutional:  fever


EENTM:  ear pain, mouth pain


Cardiovascular:  chest pain (intermittently, none currently)


Gastrointestinal:  no symptoms reported


Genitourinary:  no symptoms reported


Musculoskeletal:  no symptoms reported


Skin:  no symptoms reported


Psychiatric/Neurological:  Paresthesia (feet, chronic)





Reviewed Test Results


Reviewed Test Results


Lab





Laboratory Tests








Test


 11/12/18


00:40 11/12/18


02:16 11/12/18


03:05 Range/Units


 


 


White Blood Count


 13.4 H


 


 16.2 H


 4.3-11.0


10^3/uL


 


Red Blood Count


 4.62 


 


 4.61 


 4.35-5.85


10^6/uL


 


Hemoglobin 15.1   15.2  13.3-17.7  G/DL


 


Hematocrit 44   44  40-54  %


 


Mean Corpuscular Volume 95   94  80-99  FL


 


Mean Corpuscular Hemoglobin 33   33  25-34  PG


 


Mean Corpuscular Hemoglobin


Concent 35 


 


 35 


 32-36  G/DL





 


Red Cell Distribution Width 13.3   13.3  10.0-14.5  %


 


Platelet Count


 224 


 


 247 


 130-400


10^3/uL


 


Mean Platelet Volume 9.8   10.1  7.4-10.4  FL


 


Neutrophils (%) (Auto) 76 H  75  42-75  %


 


Lymphocytes (%) (Auto) 12   12  12-44  %


 


Monocytes (%) (Auto) 9   10  0-12  %


 


Eosinophils (%) (Auto) 3   3  0-10  %


 


Basophils (%) (Auto) 0   0  0-10  %


 


Neutrophils # (Auto) 10.2 H  12.1 H 1.8-7.8  X 10^3


 


Lymphocytes # (Auto) 1.5   1.9  1.0-4.0  X 10^3


 


Monocytes # (Auto) 1.3 H  1.6 H 0.0-1.0  X 10^3


 


Eosinophils # (Auto)


 0.4 H


 


 0.5 H


 0.0-0.3


10^3/uL


 


Basophils # (Auto)


 0.0 


 


 0.0 


 0.0-0.1


10^3/uL


 


Sodium Level 140   136  135-145  MMOL/L


 


Potassium Level 3.5 L  4.2  3.6-5.0  MMOL/L


 


Chloride Level 108 H  105    MMOL/L


 


Carbon Dioxide Level 18 L  20 L 21-32  MMOL/L


 


Anion Gap 14   11  5-14  MMOL/L


 


Blood Urea Nitrogen 6 L  5 L 7-18  MG/DL


 


Creatinine


 0.85 


 


 0.80 


 0.60-1.30


MG/DL


 


Estimat Glomerular Filtration


Rate > 60 


 


 > 60 


  





 


BUN/Creatinine Ratio 7   6   


 


Glucose Level 125 H  113 H   MG/DL


 


Lactic Acid Level


 2.92 *H


 


 1.85 


 0.50-2.00


MMOL/L


 


Calcium Level 9.4   9.7  8.5-10.1  MG/DL


 


Corrected Calcium 9.3   9.6  8.5-10.1  MG/DL


 


Total Bilirubin 0.4   0.5  0.1-1.0  MG/DL


 


Aspartate Amino Transf


(AST/SGOT) 18 


 


 22 


 5-34  U/L





 


Alanine Aminotransferase


(ALT/SGPT) 17 


 


 16 


 0-55  U/L





 


Alkaline Phosphatase 110   110    U/L


 


Total Protein 7.5   7.6  6.4-8.2  GM/DL


 


Albumin 4.1   4.1  3.2-4.5  GM/DL


 


Serum Alcohol 39 H   <10  MG/DL


 


Urine Opiates Screen  NEGATIVE   NEGATIVE  


 


Urine Oxycodone Screen  NEGATIVE   NEGATIVE  


 


Urine Methadone Screen  NEGATIVE   NEGATIVE  


 


Urine Propoxyphene Screen  NEGATIVE   NEGATIVE  


 


Urine Barbiturates Screen  NEGATIVE   NEGATIVE  


 


Ur Tricyclic Antidepressants


Screen 


 NEGATIVE 


 


 NEGATIVE  





 


Urine Phencyclidine Screen  NEGATIVE   NEGATIVE  


 


Urine Amphetamines Screen  NEGATIVE   NEGATIVE  


 


Urine Methamphetamines Screen  NEGATIVE   NEGATIVE  


 


Urine Benzodiazepines Screen  NEGATIVE   NEGATIVE  


 


Urine Cocaine Screen  NEGATIVE   NEGATIVE  


 


Urine Cannabinoids Screen  POSITIVE H  NEGATIVE  


 


Neutrophils % (Manual)   72   %


 


Lymphocytes % (Manual)   15   %


 


Monocytes % (Manual)   9   %


 


Eosinophils % (Manual)   4   %


 


Stomatocytes   MODERATE   








Radiology


CT face: DRAFT "IMPRESSION:  Left periapical and subperiosteal abscess 

associated with the left maxillary bicuspid tooth #11. Some chronic paranasal 

sinus membrane thickening without air-fluid level. No other acute finding."





Physical Exam-([facility])


Physical Exam


Vital Signs





 VS - Last 72 Hours, by Label








 11/12/18 11/12/18 11/12/18 11/12/18





 00:20 00:47 02:54 03:10


 


Temp 96.7 96.7 97.1 


 


Pulse 106  101 


 


Resp 18  18 


 


B/P (MAP) 170/115 (133)  167/104 (125) 


 


Pulse Ox 98  98 100


 


O2 Delivery Room Air  Room Air Room Air


 


    





 11/12/18 11/12/18 11/12/18 11/12/18





 04:00 05:50 06:00 08:00


 


Temp 102.3 102.3 102.5 101.4


 


Pulse 110  119 118


 


Resp 16  26 18


 


B/P (MAP) 158/95 (116)  146/87 (106) 138/83 (101)


 


Pulse Ox 97  98 96


 


O2 Delivery Room Air  Room Air Room Air


 


    





 11/12/18 11/12/18  





 08:00 08:08  


 


Temp  101.4  


 


Pulse Ox 96   


 


O2 Delivery Room Air   





Capillary Refill : Less Than 3 Seconds


General Appearance:  no apparent distress


HEENT:  other (left facial swelling with eye swollen shut but able to open 

actively)


Respiratory:  lungs clear


Cardiovascular:  regular rate, rhythm, no murmur


Gastrointestinal:  normal bowel sounds, non tender, soft


Neurologic/Psychiatric:  alert, other (reports hearing voices which is chronic 

for him- telling him to leave hospital last night)


Skin:  warm/dry





Assessment/Plan


Assessment/Plan


Admission Status:  Inpatient Order (span 2 midnights)


Reason for Inpatient Admission:  


Severe cellulitis with sepsis requiring intravenous antibiotics.





(1) Sepsis


Status:  Acute


Assessment & Plan:  With fever, tachycardia, leukocytosis and mild lactic 

acidosis on admission.


-Started on ceftriaxone in ER, will change to amp/sulbactam for great anaerobic 

coverage. Lactic acidosis resolved.





(2) Dental abscess


Status:  Acute


Assessment & Plan:  Amp/sulbactam, Dr. Hernández consulted. No large abscess noted 

on CT.





(3) Facial cellulitis


Status:  Acute


Assessment & Plan:  Secondary to dental infection, amp/sulbactam as noted above.





(4) Hypertension


Status:  Chronic


Assessment & Plan:  Resume home medications


Qualifiers:  


   Qualified Codes:  I10 - Essential (primary) hypertension


(5) Hyperlipidemia


Status:  Chronic


Assessment & Plan:  Resume home atorvastatin





(6) Psychosis


Status:  Chronic


Assessment & Plan:  Auditory hallucinations have been an ongoing problem, he 

has been very hesitant to see Psychiatry or take medications, but states he is 

willing to try. His voices typically tell him things that are not markedly 

dangerous but concerning- such as that he should leave the hospital. He denies 

any instructions to harm self or others. He states he would be interested in 

long-acting injectable because he and his wife agree he is unlikely to continue 

medications at home.


Qualifiers:  


   Qualified Codes:  F29 - Unspecified psychosis not due to a substance or 

known physiological condition


(7) Coronary artery disease


Status:  Chronic


Assessment & Plan:  Resume home clopidogrel and aspirin. If chest pain recurs, 

EKG and consider Cardiology consult as he has been resistant to follow-up in 

clinic for repeat testing after his MIs.


Qualifiers:  


   Qualified Codes:  I25.118 - Atherosclerotic heart disease of native coronary 

artery with other forms of angina pectoris


(8) Alcoholism


Status:  Chronic


Assessment & Plan:  EtOH level 39 on admit. Monitor CIWA and use lorazepam as 

needed.





(9) DVT prophylaxis


Status:  Acute


Assessment & Plan:  Enoxaparin








Clinical Quality Measures


DVT/VTE Risk/Contraindication:


Risk Factor Score Per Nursing:  3


RFS Level Per Nursing on Admit:  3=High











MARILU LOMELI MD Nov 12, 2018 12:00 pm

## 2018-11-12 NOTE — ED EENT
History of Present Illness


General


Chief Complaint:  Dental Problems/Pain


Stated Complaint:  DENTAL SWELLING & PAIN,LEFT SIDE OF FACE SWOLLEN


Nursing Triage Note:  


left dental pain, facial swelling


Source:  patient





History of Present Illness


Date Seen by Provider:  Nov 12, 2018


Time Seen by Provider:  00:25


Initial Comments


PT ARRIVES VIA POV FROM HOME


HAS CHRONIC DENTAL PROBLEMS AND HAS HAD INCREASED PAIN TO LEFT UPPER TEETH FOR 

THE PAST 2 WEEKS


STATES LEFT SIDE OF HIS FACE BEGAN TO SWELL AND HAVE INCREASE PAIN ON FRIDAY  

AFTERNOON 11/09/18, BUT WAS MUCH WORSE TODAY, AND NOW LEFT EYE IS COMPLETELY 

SWOLLEN SHUT


STATES TONIGHT HE WOKE UP WITH SEVERE PAIN TO LEFT SIDE OF FACE


STATES HE COULD NOT OPEN HIS JAW EARLIER DUE TO PAIN AND SWELLING


HAS HAD PURULENT DRAINAGE FROM LEFT EYE FOR THE LAST COUPLE OF DAYS


NO KNOWN FEVER


HAS TAKEN NAPROXEN, TYLENOL AND WIFE'S GABAPENTIN AT 1900 TONIGHT --IS ONLY 

TIME HE HAS TAKEN ANYTHING FOR PAIN ( PT STATES HE TAKES HIS WIFE'S GABAPENTIN 

AT TIMES FOR HIS CHRONIC BACK PAIN ) 





STATES HE HAS BEEN ADVISED ON MULTIPLE OCCASIONS THAT HE NEEDS ALL OF HIS TEETH 

PULLED. BUT HAS NOT DONE IT. 





PT WITH LONG HISTORY OF MEDICAL NON-COMPLIANCE





PCP: DR. AGUILAR AT [facility]


CARDIOLOGIST: DR. KILPATRICK





Allergies and Home Medications


Allergies


Coded Allergies:  


     No Known Drug Allergies (Unverified , 1/4/13)





Home Medications


Aspirin 81 Mg Tablet.dr, 81 MG PO DAILY, (Reported)


Atorvastatin Calcium 80 Mg Tablet, 80 MG PO HS, (Reported)


Clopidogrel Bisulfate 75 Mg Tablet, 75 MG PO DAILY, (Reported)


Isosorbide Mononitrate 60 Mg Tab, 60 MG PO DAILY


   Prescribed by: TUSHAR MARLOW on 7/20/18 1022


Lisinopril 20 Mg Tablet, 20 MG PO DAILY, (Reported)


Metoprolol Tartrate 50 Mg Tablet, 50 MG PO BID, (Reported)





Patient Home Medication List


Home Medication List Reviewed:  Yes





Review of Systems


Review of Systems


Constitutional:  no symptoms reported


Eyes:  See HPI


Ears:  Other (LEFT EXTERNAL EAR FEELS WARM AND A  LITTLE SWOLLNE)


Nose:  other (SWELLING TO LEFT SIDE OF NOSE)


Mouth:  see HPI, pain, swelling


Throat:  no symptoms reported


Respiratory:  no symptoms reported


Cardiovascular:  no symptoms reported


Skin:  no symptoms reported


Neurological:  No Symptoms Reported


Hematologic/Lymphatic:  No Symptoms Reported


Immunological/Allergic:  no symptoms reported





Past Medical-Social-Family Hx


Patient Social History


Alcohol Use:  Regular Use (NORMALLY DRINKS 18 PACK PLUS A PINT OF HARD LIQUOR / 

DAY X 30 YEARS)


Number of Drinks Today:  6


Alcohol Beverage of Choice:  Beer


Recreational Drug Use:  Yes (THC, METH, PILLS--PAIN PILLS MOSTLY. DENIES IV USE)


Drug of Choice:  THC, METH, RX PILLS--MOSTLY PAIN PILLS. DENIES IV USE


Smoking Status:  Current Everyday Smoker (UP TO 4 PPD)


Type Used:  Cigarettes (UP TO 4 PPD)


Recent Foreign Travel:  No


Contact w/Someone Who Travel:  No


Recent Infectious Disease Expo:  No


Recent Hopitalizations:  No





Immunizations Up To Date


Tetanus Booster (TDap):  Unknown


PED Vaccines UTD:  No





Seasonal Allergies


Seasonal Allergies:  No





Past Medical History


Surgeries:  Yes (JAW FX REPAIR X2; CARDIAC CATHS WITH STENTS X 2, FIRST STENT 01 /2013, LAST STENT 03/2018)


Cardiac, Coronary Stent


Respiratory:  No


Currently Using CPAP:  No


Currently Using BIPAP:  No


Cardiac:  Yes (MI X 2 WITH STENTS X 2--LAST ONE 03/2018; MI 03/2018 WITH 

CARDIAC ARREST WITH V-FIB; LONG HISTORY OF NONCOMPLIANCE)


Coronary Artery Disease, Heart Attack, Heart Murmur, High Cholesterol, 

Hypertension, Irregular Heartbeat


Neurological:  No


Reproductive Disorders:  No


Sexually Transmitted Disease:  No


HIV/AIDS:  No


Genitourinary:  Yes


Kidney Stones


Gastrointestinal:  Yes


Gastrointestinal Bleed


Musculoskeletal:  Yes


Arthritis, Chronic Back Pain


Endocrine:  No


HEENT:  Yes (EXTENSIVE DENTAL DECAY, CHRONIC DENTAL PROBLEMS)


Loss of Vision:  Denies


Hearing Impairment:  Denies


Cancer:  No


Psychosocial:  Yes


Anxiety, Depression


Integumentary:  No


Blood Disorders:  No


Adverse Reaction/Blood Tranf:  No





Family Medical History





Unknown





Physical Exam


Vital Signs





Vital Signs - First Documented








 11/12/18





 00:20


 


Temp 96.7


 


Pulse 106


 


Resp 18


 


B/P (MAP) 170/115 (133)


 


Pulse Ox 98


 


O2 Delivery Room Air








Height, Weight, BMI


Height: 5'7.00"


Weight: 164lbs. 0oz. 74.762545ub; 23.8 BMI


Method:Stated


General Appearance:  WD/WN, no apparent distress, other (REEKS OF CIGARETTES 

AND ETOH. ROCKING BACK AND FORTH, SPEECH CLEAR, GAIT STEADY)


Eyes:  left eye other (LEFT EYE SWOLLEN SHUT, BUT WHEN EYE IS PRIED OPEN, EYE 

ITSELF APPEARS NORMAL AND PT REPORTS VISION IS NORMAL )


Ears:  left ear other (LEFT EXTERNAL EAR WITH MILD ERYTHEMA AND SWELLING. )


Nose:  other (LEFT SIDE OF NOSE WITH MODERATE SWELLING AND MILD ERYTHEMA)


Mouth/Throat:  mandibular swelling (ON LEFT), maxillary swelling (ON LEFT), 

trismus, other (EXTENSIVE DENTALY DECAY--MOST TEETH MISSING AND REMAINING TEETH 

DECAYED DOWN TO GUMS WITH EXTENSIVE EROSION OF GUM TISSUE/GINGIVAL DISEASE. 

MARKED SWELLING TO LEFT UPPER GUMS. NO GROSS PURULENT DRAINAGE OR DISCRETE AREA 

OF FLUCUTANCTE. ENTIRE LEFT SIDE OF FACE ( FOREHEAD SPARED) WITH SEVERE 

SWELLING AND LEFT EYE IS COMPLETELY AND TIGHTLY SWOLLEN SHUT. SKIN IS 

MODERATELY ERYTHEMATOUS AND VERY WARM TO TOUCH)


Neck:  non-tender, full range of motion, supple, normal inspection


Cardiovascular:  regular rate, rhythm, no murmur


Respiratory:  normal breath sounds, no respiratory distress, no accessory 

muscle use


Neurologic/Psychiatric:  no motor/sensory deficits, alert, oriented x 3


Skin:  normal color, warm/dry, other (AS ABOVE)





Progress/Results/Core Measures


Results/Orders


Lab Results





Laboratory Tests








Test


 11/12/18


00:40 11/12/18


02:16 Range/Units


 


 


White Blood Count


 13.4 H


 


 4.3-11.0


10^3/uL


 


Red Blood Count


 4.62 


 


 4.35-5.85


10^6/uL


 


Hemoglobin 15.1   13.3-17.7  G/DL


 


Hematocrit 44   40-54  %


 


Mean Corpuscular Volume 95   80-99  FL


 


Mean Corpuscular Hemoglobin 33   25-34  PG


 


Mean Corpuscular Hemoglobin


Concent 35 


 


 32-36  G/DL





 


Red Cell Distribution Width 13.3   10.0-14.5  %


 


Platelet Count


 224 


 


 130-400


10^3/uL


 


Mean Platelet Volume 9.8   7.4-10.4  FL


 


Neutrophils (%) (Auto) 76 H  42-75  %


 


Lymphocytes (%) (Auto) 12   12-44  %


 


Monocytes (%) (Auto) 9   0-12  %


 


Eosinophils (%) (Auto) 3   0-10  %


 


Basophils (%) (Auto) 0   0-10  %


 


Neutrophils # (Auto) 10.2 H  1.8-7.8  X 10^3


 


Lymphocytes # (Auto) 1.5   1.0-4.0  X 10^3


 


Monocytes # (Auto) 1.3 H  0.0-1.0  X 10^3


 


Eosinophils # (Auto)


 0.4 H


 


 0.0-0.3


10^3/uL


 


Basophils # (Auto)


 0.0 


 


 0.0-0.1


10^3/uL


 


Sodium Level 140   135-145  MMOL/L


 


Potassium Level 3.5 L  3.6-5.0  MMOL/L


 


Chloride Level 108 H    MMOL/L


 


Carbon Dioxide Level 18 L  21-32  MMOL/L


 


Anion Gap 14   5-14  MMOL/L


 


Blood Urea Nitrogen 6 L  7-18  MG/DL


 


Creatinine


 0.85 


 


 0.60-1.30


MG/DL


 


Estimat Glomerular Filtration


Rate > 60 


 


  





 


BUN/Creatinine Ratio 7    


 


Glucose Level 125 H    MG/DL


 


Lactic Acid Level


 2.92 *H


 


 0.50-2.00


MMOL/L


 


Calcium Level 9.4   8.5-10.1  MG/DL


 


Corrected Calcium 9.3   8.5-10.1  MG/DL


 


Total Bilirubin 0.4   0.1-1.0  MG/DL


 


Aspartate Amino Transf


(AST/SGOT) 18 


 


 5-34  U/L





 


Alanine Aminotransferase


(ALT/SGPT) 17 


 


 0-55  U/L





 


Alkaline Phosphatase 110     U/L


 


Total Protein 7.5   6.4-8.2  GM/DL


 


Albumin 4.1   3.2-4.5  GM/DL


 


Serum Alcohol 39 H  <10  MG/DL


 


Urine Opiates Screen  NEGATIVE  NEGATIVE  


 


Urine Oxycodone Screen  NEGATIVE  NEGATIVE  


 


Urine Methadone Screen  NEGATIVE  NEGATIVE  


 


Urine Propoxyphene Screen  NEGATIVE  NEGATIVE  


 


Urine Barbiturates Screen  NEGATIVE  NEGATIVE  


 


Ur Tricyclic Antidepressants


Screen 


 NEGATIVE 


 NEGATIVE  





 


Urine Phencyclidine Screen  NEGATIVE  NEGATIVE  


 


Urine Amphetamines Screen  NEGATIVE  NEGATIVE  


 


Urine Methamphetamines Screen  NEGATIVE  NEGATIVE  


 


Urine Benzodiazepines Screen  NEGATIVE  NEGATIVE  


 


Urine Cocaine Screen  NEGATIVE  NEGATIVE  


 


Urine Cannabinoids Screen  POSITIVE H NEGATIVE  








My Orders





Orders - JEAN PAUL,STEVIE K DO


Saline Lock/Iv-Start (11/12/18 00:30)


Cbc With Automated Diff (11/12/18 00:30)


Comprehensive Metabolic Panel (11/12/18 00:30)


Lactic Acid Analyzer (11/12/18 00:30)


Blood Culture (11/12/18 00:30)


Ceftriaxone  For Iv Use (Rocephin  For I (11/12/18 00:30)


Ketorolac Injection (Toradol Injection) (11/12/18 00:30)


Saline Lock/Iv-Start (11/12/18 00:30)


Lactated Ringers (Lr 1000 Ml Iv Solution (11/12/18 00:30)


Ct Maxillofacial W (11/12/18 00:30)


Alcohol (11/12/18 00:40)


Drug Screen Stat (Urine) (11/12/18 00:40)


Iohexol Injection (Omnipaque 350 Mg/Ml 1 (11/12/18 01:15)


Contrast Received (Contrast Received) (11/12/18 01:15)


Ns (Ivpb) (Sodium Chloride 0.9%) (11/12/18 01:15)


Hydralazine Injection (Apresoline Inject (11/12/18 02:45)





Medications Given in ED





Current Medications








 Medications  Dose


 Ordered  Sig/Isabel


 Route  Start Time


 Stop Time Status Last Admin


Dose Admin


 


 Ceftriaxone


 Sodium 2000 mg/


 Sodium Chloride  70 ml @ 


 100 mls/hr  ONCE  ONCE


 IV  11/12/18 00:30


 11/12/18 01:11 DC 11/12/18 01:20


100 MLS/HR


 


 Iohexol  100 ml  ONCE  ONCE


 IV  11/12/18 01:15


 11/12/18 01:16 DC 11/12/18 01:13


100 ML


 


 Lactated Ringer's  1,000 ml @ 


 0 mls/hr  Q0M ONCE


 IV  11/12/18 00:30


 11/12/18 00:33 DC 11/12/18 00:47


0 MLS/HR


 


 Sodium Chloride  250 ml  ONCE  ONCE


 IV  11/12/18 01:15


 11/12/18 01:16 DC 11/12/18 01:13


80 ML








Vital Signs/I&O











 11/12/18 11/12/18





 00:20 00:47


 


Temp 96.7 96.7


 


Pulse 106 


 


Resp 18 


 


B/P (MAP) 170/115 (133) 


 


Pulse Ox 98 


 


O2 Delivery Room Air 














Blood Pressure Mean:  133











Progress


Progress Note :  


Progress Note


NO DETERIORATION IN PT'S CONDITION DURING ER STAY


SWELLING AND REDNESS TO LEFT SIDE OF FACE IS IMPROVED WITH ROCEPHIN + TORADOL--

LEFT EYE IS NO LONG SWOLLEN SHUT. 





DISCUSSED WITH PT REGARDING WHETHER HE ANY HISTORY OF DT'S, OR ALCOHOL 

WITHDRAWL SEIZURES, AND HE REPORTS HE ONLY GETS THE SHAKES, AND HAS GONE A DAY 

OR TWO--UP TO A WEEK OR TWO IN THE PAST, WITHOUT DRINKING AND NEVER HAD ANY 

SERIOUS WITHDRAWL SYMPTOMS





Diagnostic Imaging





Comments


CT MAXILLOFACIALS--LARGE PERIAPICAL LUCENCY INVOLVING LEFT CUSPID TOOTH #11--

INDICATIVE OF PERIOSTEAL ABSCESS OF 7 X 12 X 14 MM. ALSO A SMALLER PERIAPICAL 

LUCENCY IN ADJACENT LATERAL MAXILLARY INCISOR. MILD MUCOSAL THICKENING OF LEFT 

MAXILLARY SINUS--PER [facility] VIA FAX @ 3884


   Reviewed:  Reviewed by Me





Departure


Communication (Admissions)


0154--SPOKE WITH DR. AGUILAR, ACCEPTS PT FOR ADMIT.





Impression





 Primary Impression:  


 DENTAL ABSCESS WITH LEFT FACIAL CELLULITIS


 Additional Impressions:  


 Sepsis


 Alcoholism


 Illicit drug use


 HTN (hypertension)


Disposition:  09 ADMITTED AS INPATIENT


Condition:  Improved





Admissions


Decision to Admit Reason:  Admit from ER (General)


Decision to Admit/Date:  Nov 12, 2018


Time/Decision to Admit Time:  01:55





Departure-Patient Inst.


Referrals:  


[facility] (PCP/Family)


Primary Care Physician











STEVIE REAVES DO Nov 12, 2018 00:56

## 2018-11-13 VITALS — SYSTOLIC BLOOD PRESSURE: 158 MMHG | DIASTOLIC BLOOD PRESSURE: 98 MMHG

## 2018-11-13 VITALS — DIASTOLIC BLOOD PRESSURE: 98 MMHG | SYSTOLIC BLOOD PRESSURE: 158 MMHG

## 2018-11-13 VITALS — SYSTOLIC BLOOD PRESSURE: 156 MMHG | DIASTOLIC BLOOD PRESSURE: 90 MMHG

## 2018-11-13 VITALS — DIASTOLIC BLOOD PRESSURE: 95 MMHG | SYSTOLIC BLOOD PRESSURE: 148 MMHG

## 2018-11-13 LAB
ALBUMIN SERPL-MCNC: 3.5 GM/DL (ref 3.2–4.5)
ALP SERPL-CCNC: 102 U/L (ref 40–136)
ALT SERPL-CCNC: 14 U/L (ref 0–55)
BILIRUB SERPL-MCNC: 1.2 MG/DL (ref 0.1–1)
BUN/CREAT SERPL: 7
CALCIUM SERPL-MCNC: 9.3 MG/DL (ref 8.5–10.1)
CHLORIDE SERPL-SCNC: 107 MMOL/L (ref 98–107)
CO2 SERPL-SCNC: 23 MMOL/L (ref 21–32)
CREAT SERPL-MCNC: 0.92 MG/DL (ref 0.6–1.3)
ERYTHROCYTE [DISTWIDTH] IN BLOOD BY AUTOMATED COUNT: 13.1 % (ref 10–14.5)
GFR SERPLBLD BASED ON 1.73 SQ M-ARVRAT: > 60 ML/MIN
GLUCOSE SERPL-MCNC: 192 MG/DL (ref 70–105)
HCT VFR BLD CALC: 40 % (ref 40–54)
HGB BLD-MCNC: 13.4 G/DL (ref 13.3–17.7)
MCH RBC QN AUTO: 32 PG (ref 25–34)
MCHC RBC AUTO-ENTMCNC: 33 G/DL (ref 32–36)
MCV RBC AUTO: 96 FL (ref 80–99)
PLATELET # BLD: 191 10^3/UL (ref 130–400)
PMV BLD AUTO: 10.4 FL (ref 7.4–10.4)
POTASSIUM SERPL-SCNC: 3.7 MMOL/L (ref 3.6–5)
PROT SERPL-MCNC: 6.5 GM/DL (ref 6.4–8.2)
RBC # BLD AUTO: 4.21 10^6/UL (ref 4.35–5.85)
SODIUM SERPL-SCNC: 140 MMOL/L (ref 135–145)
WBC # BLD AUTO: 17 10^3/UL (ref 4.3–11)

## 2018-11-13 RX ADMIN — ASCORBIC ACID, VITAMIN A PALMITATE, CHOLECALCIFEROL, THIAMINE HYDROCHLORIDE, RIBOFLAVIN-5 PHOSPHATE SODIUM, PYRIDOXINE HYDROCHLORIDE, NIACINAMIDE, DEXPANTHENOL, ALPHA-TOCOPHEROL ACETATE, VITAMIN K1, FOLIC ACID, BIOTIN, CYANOCOBALAMIN SCH MLS/HR: 200; 3300; 200; 6; 3.6; 6; 40; 15; 10; 150; 600; 60; 5 INJECTION, SOLUTION INTRAVENOUS at 09:06

## 2018-11-13 RX ADMIN — ISOSORBIDE MONONITRATE SCH MG: 60 TABLET, EXTENDED RELEASE ORAL at 09:06

## 2018-11-13 RX ADMIN — METOPROLOL TARTRATE SCH MG: 50 TABLET, FILM COATED ORAL at 09:06

## 2018-11-13 RX ADMIN — AMPICILLIN SODIUM AND SULBACTAM SODIUM SCH MLS/HR: 2; 1 INJECTION, POWDER, FOR SOLUTION INTRAMUSCULAR; INTRAVENOUS at 00:29

## 2018-11-13 RX ADMIN — SODIUM CHLORIDE, SODIUM LACTATE, POTASSIUM CHLORIDE, AND CALCIUM CHLORIDE SCH MLS/HR: 600; 310; 30; 20 INJECTION, SOLUTION INTRAVENOUS at 02:50

## 2018-11-13 RX ADMIN — ISOSORBIDE MONONITRATE SCH MG: 60 TABLET, EXTENDED RELEASE ORAL at 10:11

## 2018-11-13 RX ADMIN — NICOTINE SCH MG: 21 PATCH, EXTENDED RELEASE TRANSDERMAL at 09:07

## 2018-11-13 RX ADMIN — KETOROLAC TROMETHAMINE PRN MG: 30 INJECTION, SOLUTION INTRAMUSCULAR; INTRAVENOUS at 03:41

## 2018-11-13 RX ADMIN — AMPICILLIN SODIUM AND SULBACTAM SODIUM SCH MLS/HR: 2; 1 INJECTION, POWDER, FOR SOLUTION INTRAMUSCULAR; INTRAVENOUS at 05:26

## 2018-11-13 RX ADMIN — ACETAMINOPHEN PRN MG: 500 TABLET ORAL at 09:06

## 2018-11-13 RX ADMIN — SODIUM CHLORIDE, SODIUM LACTATE, POTASSIUM CHLORIDE, AND CALCIUM CHLORIDE SCH MLS/HR: 600; 310; 30; 20 INJECTION, SOLUTION INTRAVENOUS at 06:37

## 2018-11-13 RX ADMIN — KETOROLAC TROMETHAMINE PRN MG: 30 INJECTION, SOLUTION INTRAMUSCULAR; INTRAVENOUS at 10:06

## 2018-11-13 NOTE — DISCHARGE INSTRUCTIONS
Discharge [facility]


Discharge Medications


New, Converted or Re-Newed RX:  Transmitted to Pharmacy


New Medications:  


Amoxicillin/Potassium Clav (Augmentin 875-125 Tablet) 1 Each Tablet


1 EACH PO BID for 10 Days, #20 TAB 0 Refills





 


Continued Medications:  


Aspirin (Aspirin EC) 81 Mg Tablet.dr


81 MG PO DAILY, TAB





Atorvastatin Calcium (Atorvastatin Calcium) 80 Mg Tablet


80 MG PO DAILY, TAB





Clopidogrel Bisulfate (Plavix) 75 Mg Tablet


75 MG PO DAILY, TAB





Isosorbide Mononitrate (Isosorbide Mononitrate ER) 60 Mg Tab


60 MG PO DAILY, TAB





Lisinopril (Lisinopril) 20 Mg Tablet


20 MG PO DAILY, TAB





Metoprolol Tartrate (Metoprolol Tartrate) 50 Mg Tablet


50 MG PO BID, TAB











Patient Instructions


Goal/Follow Up Appt:  


Follow-up with - Dec 18 at 1 pm at [facility].


Follow up with Dr. Lomeli on Nov 19 at 10:40 am.


Follow up with Dr. Hernández at 3 pm today.


Return to The Hospital For:  


Fever, worsening facial swelling, chest pain, suicidal thoughts, hearing


voices threatening danger to self or others





Activity & Diet


Discharge Diet:  Cardiac Diet


Activity as Tolerated:  Yes





Copy


Copies To 1:   MARILU LOMELI MD, BETHANY N MD Nov 13, 2018 11:54 am

## 2018-11-14 NOTE — DISCHARGE SUMMARY
Diagnosis/Chief Complaint


Date of Admission


Nov 12, 2018 at 01:55


Date of Discharge


Nov 13, 2018 at 12:35


Admission Diagnosis


Admission Diagnosis


Sepsis


Dental abscess


Facial cellulitis





Discharge Diagnosis


See problem list


Problems/Diagnosis:  


(1) Sepsis


Assessment & Plan:  With fever, tachycardia, leukocytosis and mild lactic 

acidosis on admission.


-Started on ceftriaxone in ER, changed to amp/sulbactam for great anaerobic 

coverage. Lactic acidosis resolved. Discharged on Augmentin.


Status:  Acute


(2) Dental abscess


Assessment & Plan:  Amp/sulbactam, Dr. Hernández consulted. No large abscess noted 

on CT.


Pt okay for d/c per Dr. Hernández to be seen same afternoon outpatient, patient 

requesting to go in spite of fever this am.


Status:  Acute


(3) Facial cellulitis


Assessment & Plan:  Secondary to dental infection, amp/sulbactam as noted above.


Status:  Acute


(4) Hypertension


Assessment & Plan:  Resume home medications


Qualifiers:  


   Qualified Codes:  I10 - Essential (primary) hypertension


Status:  Chronic


(5) Hyperlipidemia


Assessment & Plan:  Resume home atorvastatin


Status:  Chronic


(6) Psychosis


Assessment & Plan:  Auditory hallucinations have been an ongoing problem, he 

has been very hesitant to see Psychiatry or take medications, but states he is 

willing to try. His voices typically tell him things that are not markedly 

dangerous but concerning- such as that he should leave the hospital. He denies 

any instructions to harm self or others. He states he would be interested in 

long-acting injectable because he and his wife agree he is unlikely to continue 

medications at home.


Appt scheduled with Behavioral Health and he states he will attend.


Qualifiers:  


   Qualified Codes:  F29 - Unspecified psychosis not due to a substance or 

known physiological condition


Status:  Chronic


(7) Coronary artery disease


Assessment & Plan:  Resume home clopidogrel and aspirin. If chest pain recurs, 

EKG and consider Cardiology consult as he has been resistant to follow-up in 

clinic for repeat testing after his MIs.


Qualifiers:  


   Qualified Codes:  I25.118 - Atherosclerotic heart disease of native coronary 

artery with other forms of angina pectoris


Status:  Chronic


(8) Alcoholism


Assessment & Plan:  EtOH level 39 on admit. Monitor CIWA and use lorazepam as 

needed.


Status:  Chronic





Chief Complaint/HPI


Chief Complaint/HPI


49 yo male came to ER due to severe facial swelling associated with tooth 

problem. He states that he started having pain and swelling on Friday evening 

about 2 days ago, which progressively got worse. He has had some teeth pulled 

in the area, but apparently was to have more done and was not happy with the 

dentist so had not been back.





Discharge Summary-Simple/Stand


Consultations





Discharge Physical Examination


Allergies:  


Coded Allergies:  


     No Known Drug Allergies (Unverified , 1/4/13)


Vitals & I&Os





Vital Sign - Last 12Hours








  Date Time  Temp Pulse Resp B/P (MAP) Pulse Ox O2 Delivery O2 Flow Rate FiO2


 


11/13/18 12:35  100 16 158/98 97 Room Air  


 


11/13/18 08:26 100.9       








General Appearance:  Alert


HEENT:  Other (swollen left face, improved from day prior but still significant)


Respiratory:  Clear to Auscultation, Normal Air Movement


Cardiovascular:  Regular Rate, No Murmurs


Neuro:  Normal Speech





Hospital Course


See final discharge diagnosis.


Labs





Laboratory Tests








Test


 11/13/18


05:14 Range/Units


 


 


White Blood Count


 17.0 H


 4.3-11.0


10^3/uL


 


Red Blood Count


 4.21 L


 4.35-5.85


10^6/uL


 


Hemoglobin 13.4  13.3-17.7  G/DL


 


Hematocrit 40  40-54  %


 


Mean Corpuscular Volume 96  80-99  FL


 


Mean Corpuscular Hemoglobin 32  25-34  PG


 


Mean Corpuscular Hemoglobin


Concent 33 


 32-36  G/DL





 


Red Cell Distribution Width 13.1  10.0-14.5  %


 


Platelet Count


 191 


 130-400


10^3/uL


 


Mean Platelet Volume 10.4  7.4-10.4  FL


 


Sodium Level 140  135-145  MMOL/L


 


Potassium Level 3.7  3.6-5.0  MMOL/L


 


Chloride Level 107    MMOL/L


 


Carbon Dioxide Level 23  21-32  MMOL/L


 


Anion Gap 10  5-14  MMOL/L


 


Blood Urea Nitrogen 6 L 7-18  MG/DL


 


Creatinine


 0.92 


 0.60-1.30


MG/DL


 


Estimat Glomerular Filtration


Rate > 60 


  





 


BUN/Creatinine Ratio 7   


 


Glucose Level 192 H   MG/DL


 


Calcium Level 9.3  8.5-10.1  MG/DL


 


Corrected Calcium 9.7  8.5-10.1  MG/DL


 


Total Bilirubin 1.2 H 0.1-1.0  MG/DL


 


Aspartate Amino Transf


(AST/SGOT) 16 


 5-34  U/L





 


Alanine Aminotransferase


(ALT/SGPT) 14 


 0-55  U/L





 


Alkaline Phosphatase 102    U/L


 


Total Protein 6.5  6.4-8.2  GM/DL


 


Albumin 3.5  3.2-4.5  GM/DL








Radiology Reviewed


CT face: DRAFT "IMPRESSION:  Left periapical and subperiosteal abscess 

associated with the left maxillary bicuspid tooth #11. Some chronic paranasal 

sinus membrane thickening without air-fluid level. No other acute finding.





Discharge


Instructions to patient/family


Please see electronic discharge instructions given to patient.


Discharge Medications


Reviewed and agree with Discharge Medication list on patient's Discharge 

Instruction sheet





Clinical Quality Measures


DVT/VTE Risk/Contraindication:


Risk Factor Score Per Nursing:  3


RFS Level Per Nursing on Admit:  3=High





Copy


Copies To 1:   MARILU AGUILAR MD, BETHANY N MD Nov 14, 2018 21:18

## 2019-09-05 ENCOUNTER — HOSPITAL ENCOUNTER (OUTPATIENT)
Dept: HOSPITAL 75 - RAD | Age: 49
End: 2019-09-05
Attending: FAMILY MEDICINE
Payer: SELF-PAY

## 2019-09-05 DIAGNOSIS — M47.816: ICD-10-CM

## 2019-09-05 DIAGNOSIS — M48.07: Primary | ICD-10-CM

## 2019-09-05 DIAGNOSIS — M51.26: ICD-10-CM

## 2019-09-05 PROCEDURE — 72148 MRI LUMBAR SPINE W/O DYE: CPT

## 2019-09-05 NOTE — DIAGNOSTIC IMAGING REPORT
PROCEDURE: MRI lumbar spine.



TECHNIQUE: Multiplanar, multisequence MRI of the lumbar spine was

performed without contrast.



INDICATION: Bilateral lower extremity weakness. Low back pain. 



COMPARISON: Lumbar spine radiographs 09/10/2009.



FINDINGS: There are five lumbar-type vertebral bodies for the

purposes of this report. Normal alignment. Vertebral body heights

are preserved. Normal bone marrow signal. No abnormal signal in

the conus which terminates at L1. Normal morphology of the cauda

equina. The visualized abdominal and pelvic contents are

unremarkable.



At L4-L5, there is a large central disc extrusion with inferior

migration measuring up to 1 cm. This results in severe spinal

canal stenosis. Disc space height loss also contributes to

advanced right and moderate left neural foraminal narrowing.



At L5-S1, there is a small central disc protrusion which

contributes to mild left lateral recess narrowing. No substantial

spinal canal narrowing. Disc space height loss also contributes

to advanced right and mild left neural foraminal narrowing.



The intervertebral discs are otherwise well preserved. No other

high-grade spinal canal, lateral recess, or neural foraminal

narrowing.



IMPRESSION: 

1. Large central disc extrusion with inferior migration at L4-L5

results in severe spinal canal stenosis.

2. Spondylotic changes also result in high-grade bilateral neural

foraminal narrowing at L4-L5 and on the right at L5-S1.

3. No acute osseous findings.



Dictated by: 



  Dictated on workstation # [id number]

## 2019-09-09 ENCOUNTER — HOSPITAL ENCOUNTER (EMERGENCY)
Dept: HOSPITAL 75 - ER | Age: 49
Discharge: LEFT BEFORE DISCHARGE | End: 2019-09-09
Payer: SELF-PAY

## 2019-09-09 DIAGNOSIS — M54.9: Primary | ICD-10-CM

## 2019-09-11 ENCOUNTER — HOSPITAL ENCOUNTER (EMERGENCY)
Dept: HOSPITAL 75 - ER | Age: 49
Discharge: HOME | End: 2019-09-11
Payer: SELF-PAY

## 2019-09-11 VITALS — SYSTOLIC BLOOD PRESSURE: 144 MMHG | DIASTOLIC BLOOD PRESSURE: 108 MMHG

## 2019-09-11 VITALS — HEIGHT: 66.93 IN | WEIGHT: 174.17 LBS | BODY MASS INDEX: 27.34 KG/M2

## 2019-09-11 DIAGNOSIS — F17.210: ICD-10-CM

## 2019-09-11 DIAGNOSIS — I25.2: ICD-10-CM

## 2019-09-11 DIAGNOSIS — Z79.02: ICD-10-CM

## 2019-09-11 DIAGNOSIS — E78.00: ICD-10-CM

## 2019-09-11 DIAGNOSIS — Z79.82: ICD-10-CM

## 2019-09-11 DIAGNOSIS — I10: ICD-10-CM

## 2019-09-11 DIAGNOSIS — F41.9: ICD-10-CM

## 2019-09-11 DIAGNOSIS — F32.9: ICD-10-CM

## 2019-09-11 DIAGNOSIS — I25.10: ICD-10-CM

## 2019-09-11 DIAGNOSIS — M51.16: Primary | ICD-10-CM

## 2019-09-11 DIAGNOSIS — Z95.5: ICD-10-CM

## 2019-09-11 LAB
APTT PPP: (no result) S
BACTERIA #/AREA URNS HPF: (no result) /HPF
BARBITURATES UR QL: NEGATIVE
BENZODIAZ UR QL SCN: POSITIVE
COCAINE UR QL: NEGATIVE
FIBRINOGEN PPP-MCNC: CLEAR MG/DL
GLUCOSE UR STRIP-MCNC: NEGATIVE MG/DL
HYALINE CASTS #/AREA URNS LPF: (no result) /LPF
KETONES UR QL STRIP: (no result)
LEUKOCYTE ESTERASE UR QL STRIP: (no result)
METHADONE UR QL SCN: NEGATIVE
METHAMPHETAMINE SCREEN URINE S: NEGATIVE
NITRITE UR QL STRIP: NEGATIVE
OPIATES UR QL SCN: NEGATIVE
OXYCODONE UR QL: NEGATIVE
PH UR STRIP: 5 [PH] (ref 5–9)
PROPOXYPH UR QL: NEGATIVE
PROT UR QL STRIP: (no result)
RBC #/AREA URNS HPF: (no result) /HPF
SP GR UR STRIP: 1.02 (ref 1.02–1.02)
TRICYCLICS UR QL SCN: NEGATIVE
UROBILINOGEN UR-MCNC: 4 MG/DL
WBC #/AREA URNS HPF: (no result) /HPF

## 2019-09-11 PROCEDURE — 80306 DRUG TEST PRSMV INSTRMNT: CPT

## 2019-09-11 PROCEDURE — 87088 URINE BACTERIA CULTURE: CPT

## 2019-09-11 PROCEDURE — 81000 URINALYSIS NONAUTO W/SCOPE: CPT

## 2019-09-11 PROCEDURE — 99282 EMERGENCY DEPT VISIT SF MDM: CPT

## 2019-09-11 NOTE — ED BACK PAIN
General


Chief Complaint:  Back Problems


Stated Complaint:  BACK PAIN


Nursing Triage Note:  


Pt amb to triage with c/o lt lower back pain with loss of bladder/bowel control.




Pt reports in "early August," while @ work pt experienced strenuous labor and 


woke up the next day with extreme back pain. Reports to have seen PCP Dr. Aguilar 


who ordered an MRI. Reports to have waited "a couple weeks" before having MRI 


performed (unaware of scan date). Pt reports after MRI, Dr. Aguilar advised pt to 


be seen in ED d/t findings. Pt reports he did not seek medical attention until 


this day. Pt reports to have experienced loss of bowel & bladder control this 


am. Reports pins/needle with intermittent numbness to lt lower extremity as well




as lt thumb.


Nursing Sepsis Screen:  No Definite Risk


Source of Information:  Patient, Spouse


Exam Limitations:  Other (PT AND WIFE BOTH GIVE SOME CONFLICTING/INCONSISTENT 

AND CONVOLUTED INFORMATION)





History of Present Illness


Date Seen by Provider:  Sep 11, 2019


Time Seen by Provider:  18:15


Initial Comments


PT ARRIVES VIA POV 


C/O LOW BACK PAIN, RADIATING DOWN LEFT POSTERIOR BUTTOCK, POSTERIOR LEFT THIGH, 

DOWN TO LEFT POSTERIOR CALF


C/O SLIGHT NUMBNESS AND TINGLING TO LEFT ANTERIOR THIGH OFF AND ON ( ALSO C/O 

INTERMITTENT NUMBNESS AND TINGLING TO LEFT THUMB"


DENIES ANY PROBLEMS WITH BOWEL OR BLADDER FUNCTION TO ME--STATES HE HAD A  

NORMAL BM TODAY AND HAS VOIDED NORMALLY TODAY --NO INCONTINENCE OR INABILITY TO 

VOID/DEFECATE


NO SADDLE ANESTHESIA


NO DIFFICULTY WALKING, OTHER THAN DUE TO PAIN





STATES OVER A MONTH AGO, HE WAS WORKING IN [employer] ( SELF EMPLOYED ) AND WAS ON A

PIECE OF DIGGING EQUIPMENT FOR 7 HOURS, WAS VERY HOT OUTSIDE, AND STATES "I 

THINK I HAD HEAT EXHAUSTION"--SON HAD TO HELP HIM OFF THE EQUIPMENT, AND THEN 

WHEN HE GOT HOME, HE "COLLAPSED" ON THE FLOOR


HAS HAD PAIN IN LOWER BACK SINCE THAT TIME, BUT DID NOT NOT SEEK CARE AT THAT 

TIME


PT STATES HE SAW DR. AGUILAR 3 WEEKS AGO FOR THIS PROBLEM, AND MRI WAS 

ORDERED/SCHEDULED AND THEN PT REFUSED DUE TO ANXIETY, THEN RESCHEDULED WITH PRE-

MEDICATION AND WAS COMPLETED ON 09/05/19


PT STATES "SOMEONE CALLED ME AT 01:00 IN THE MORNING AND TOLD ME TO COME HERE TO

GET MY MRI RESULTS AND THAT I NEEDED TO GO TO [place]"


THEN STATES HE "TALKED TO DR. AGUILAR 3 DAYS AGO, AND SHE TOLD ME TO COME HERE TO 

ER TO GET MY MRI RESULTS AND TO GO TO [place]" 





PT STATES HE HAS NOT BEEN PRESCRIBED ANY MEDICATIONS


TOOK NAPROXEN AT 1500 TODAY, OTHERWISE HAS NOT TAKEN ANYTHING FOR PAIN





PT STATES IN 1996, "BROKE MY LOW BACK" --STATES HE WAS SEEN BY DR. BERTRAND AT 

THAT TIME, AND NO SURGERY WAS DONE. 


DOES HAVE SOME CHRONIC LOW BACK PAIN. 





PT HAS A VERY LONG HISTORY OF NON-COMPLIANCE IN ALL ASPECTS OF CARE


Other Comments





PCP: DR. GAUILAR





Allergies and Home Medications


Allergies


Coded Allergies:  


     No Known Drug Allergies (Unverified , 1/4/13)





Home Medications


Amoxicillin/Potassium Clav 1 Each Tablet, 1 EACH PO BID


   Prescribed by: MARILU AGUILAR on 11/13/18 1152


Aspirin 81 Mg Tablet.dr, 81 MG PO DAILY, (Reported)


Atorvastatin Calcium 80 Mg Tablet, 80 MG PO DAILY, (Reported)


Clopidogrel Bisulfate 75 Mg Tablet, 75 MG PO DAILY, (Reported)


Isosorbide Mononitrate 60 Mg Tab, 60 MG PO DAILY, (Reported)


Lisinopril 20 Mg Tablet, 20 MG PO DAILY, (Reported)


Methylprednisolone 4 Mg Tab.ds.pk, 4 MG PO UD


   Prescribed by: STEVIE REAVES on 9/11/19 1912


Metoprolol Tartrate 50 Mg Tablet, 50 MG PO BID, (Reported)


Tizanidine HCl 4 Mg Capsule, 4 MG PO TID


   Prescribed by: STEVIE REAVES on 9/11/19 1912





Patient Home Medication List


Home Medication List Reviewed:  Yes





Review of Systems


Constitutional:  no symptoms reported


Respiratory:  no symptoms reported


Cardiovascular:  no symptoms reported


Gastrointestinal:  no symptoms reported; No abdominal pain, No constipation, No 

diarrhea, No nausea, No vomiting


Genitourinary:  no symptoms reported; No decreased output, No dysuria, No 

frequency, No hesitancy, No incontinence, No pain


Musculoskeletal:  see HPI, back pain


Skin:  no symptoms reported; No rash


Psychiatric/Neurological:  See HPI





Past Medical-Social-Family Hx


Patient Social History


Alcohol Use:  Regular Use (USED TO DRINK AN 18 PACK OR MORE, PLUS A PINT OF HARD

LIQUOR EVERY DAY FOR OVER 30 YEARS--OFTEN MUCH MORE THAN THAT, NOW DRINKS A 6-8 

PACK A DAY--PER PT 09/11/19)


Number of Drinks Today:  AA


Alcohol Beverage of Choice:  Beer


Recreational Drug Use:  Yes (HX OF METH, COCAINE, HEROIN USE, THC USE, RX 

PILLS--MOSTLY PAIN PILLS. DENIES IV USE)


Drug of Choice:  THC, METH, COCAINE, HEROIN, RX PILLS--MOSTLY PAIN PILLS. DENIES

IV USE


Smoking Status:  Current Everyday Smoker (UP TO 4 PPD)


Type Used:  Cigarettes


2nd Hand Smoke Exposure:  Yes


Recent Foreign Travel:  No


Contact w/Someone Who Travel:  No


Recent Infectious Disease Expo:  No


Recent Hopitalizations:  No





Immunizations Up To Date


Tetanus Booster (TDap):  Unknown


PED Vaccines UTD:  No





Seasonal Allergies


Seasonal Allergies:  No





Past Medical History


Surgeries:  Yes (CARDIAC CATHS-STENTS X 3 + ANGIOPLASTIES--CATH IN 2013 WITH 

STENT X 1; CATH 03/2018--STENTS X 2; CATH 04/2018--PATENT STENTS/NO INTERVENTIO

N; JAW FRACTURE/SURGERY X 2; )


Cardiac, Coronary Stent


Respiratory:  No


Currently Using CPAP:  No


Currently Using BIPAP:  No


Cardiac:  Yes (MI X 2 WITH STENTS X 3--FIRST ONE 2013--1 STENT; HAD MI 03/2018 

WITH V-FIB/CODED/CARDIAC ARREST--STENTS X 2 + ANGIOPLASTIES; CARDIAC CATH 

04/2018--PATENT STENTS)


Coronary Artery Disease, Heart Attack, Heart Murmur, High Cholesterol, 

Hypertension, Irregular Heartbeat


Neurological:  No


Reproductive Disorders:  No


Sexually Transmitted Disease:  No


HIV/AIDS:  No


Genitourinary:  Yes


Kidney Stones


Gastrointestinal:  Yes


Gastrointestinal Bleed


Musculoskeletal:  Yes


Arthritis, Back Injury, Chronic Back Pain


Endocrine:  No


HEENT:  Yes (EXTENSIVE DENTAL DECAY, CHRONIC DENTAL PROBLEMS / ABSCESSES)


Loss of Vision:  Denies


Hearing Impairment:  Denies


Cancer:  No


Psychosocial:  Yes (POLYSUBSTANCE ABUSE; SUICICAL THOUGHTS WHEN UNDER THE 

INFLUENCE)


Anxiety, Depression


Integumentary:  No


Blood Disorders:  No


Adverse Reaction/Blood Tranf:  No





Family Medical History





Unknown





Physical Exam


Vital Signs





Vital Signs - First Documented








 9/11/19





 17:30


 


Temp 36.7


 


Pulse 76


 


Resp 17


 


B/P (MAP) 150/107 (121)


 


Pulse Ox 98


 


O2 Delivery Room Air





Capillary Refill : Less Than 3 Seconds


Height, Weight, BMI


Height: 5'7.00"


Weight: 173lbs. 0.0oz. [id number]; 27.00 BMI


Method:Stated


General Appearance:  No Apparent Distress, WD/WN, Other (SITTING, 

PLAYING/TEXTING ON PHONE; WALKS UPRIGHT AND MOVES WITHOUT DIFFICULTY. REEKS OF 

CIGARETTES. )


Neck:  Full Range of Motion, Normal Inspection, Non Tender, Supple


Cardiovascular:  Regular Rate, Rhythm, No Edema, No JVD, No Murmur, Normal 

Peripheral Pulses


Respiratory:  Normal Breath Sounds, No Accessory Muscle Use, No Respiratory 

Distress


Peripheral Pulses:  2+ Dorsalis Pedis (R), 2+ Left Dors-Pedis (L)


Gastrointestinal:  Non Tender, Soft


Back:  No CVA Tenderness, Other (LUMBAR TENDERNESS; PATELLAR REFLEXES +2/4 

BILATERALLY; + STRAIGHT LEG RAISING + AT 30 DEGREES BILATERALLY, WITH MORE PAIN 

ON LEFT; )


Extremity:  Normal Capillary Refill, Normal Inspection, Normal Range of Motion, 

Non Tender, No Calf Tenderness, No Pedal Edema


Neurologic/Psychiatric:  Alert, Oriented x3, No Motor/Sensory Deficits, Normal 

Mood/Affect, CNs II-XII Norm as Tested


Skin:  Normal Color, Warm/Dry; No Rash





Progress/Results/Core Measures


Results/Orders


Lab Results





Laboratory Tests








Test


 9/11/19


18:47 Range/Units


 


 


Urine Color JACQUI H  


 


Urine Clarity CLEAR   


 


Urine pH 5  5-9  


 


Urine Specific Gravity 1.025 H 1.016-1.022  


 


Urine Protein 2+ H NEGATIVE  


 


Urine Glucose (UA) NEGATIVE  NEGATIVE  


 


Urine Ketones 1+ H NEGATIVE  


 


Urine Nitrite NEGATIVE  NEGATIVE  


 


Urine Bilirubin 2+ icto=neg  NEGATIVE  


 


Urine Urobilinogen 4 H NORMAL  MG/DL


 


Urine Leukocyte Esterase 1+ H NEGATIVE  


 


Urine RBC (Auto) NEGATIVE  NEGATIVE  


 


Urine RBC NONE   /HPF


 


Urine WBC 5-10 H  /HPF


 


Urine Crystals NONE   /LPF


 


Urine Bacteria TRACE   /HPF


 


Urine Casts PRESENT   /LPF


 


Urine Hyaline Casts 5-10 H  /LPF


 


Urine Mucus LARGE H  /LPF


 


Urine Culture Indicated YES   


 


Urine Opiates Screen NEGATIVE  NEGATIVE  


 


Urine Oxycodone Screen NEGATIVE  NEGATIVE  


 


Urine Methadone Screen NEGATIVE  NEGATIVE  


 


Urine Propoxyphene Screen NEGATIVE  NEGATIVE  


 


Urine Barbiturates Screen NEGATIVE  NEGATIVE  


 


Ur Tricyclic Antidepressants


Screen NEGATIVE 


 NEGATIVE  





 


Urine Phencyclidine Screen NEGATIVE  NEGATIVE  


 


Urine Amphetamines Screen NEGATIVE  NEGATIVE  


 


Urine Methamphetamines Screen NEGATIVE  NEGATIVE  


 


Urine Benzodiazepines Screen POSITIVE H NEGATIVE  


 


Urine Cocaine Screen NEGATIVE  NEGATIVE  


 


Urine Cannabinoids Screen POSITIVE H NEGATIVE  








My Orders





Orders - STEVIE REAVES DO


Drug Screen Stat (Urine) (9/11/19 18:32)


Ua Culture If Indicated (9/11/19 18:32)


Urine Culture (9/11/19 18:47)





Vital Signs/I&O











 9/11/19 9/11/19





 17:30 19:36


 


Temp 36.7 


 


Pulse 76 87


 


Resp 17 20


 


B/P (MAP) 150/107 (121) 144/108


 


Pulse Ox 98 95


 


O2 Delivery Room Air 














Blood Pressure Mean:                    121











Progress


Progress Note :  


Progress Note


REVIEWED MRI RESULTS WITH PT





Departure


Communication (Admissions)





1845--CALLED [facility]--PT PREFERENCE, PAGING NEUROSURGEON


1850--SPOKE WITH DR. SALDANA, HE STATES HE CAN SEE PT IN FOLLOW UP, AS IT IS NOT

AN ACUTE PROBLEM AND DOES NOT REQUIRE IMMEDIATE/EMERGENT TRANSFER AND SURGERY, 

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 

PREFERABLE TO HAVE FORMAL REFERRAL FROM HER PCP, WHICH IS DR. AGUILAR AT [facility], 

AND HE HAS HAD MANY REFERRALS FROM THEM BEFORE, SO PROPER COMMUNICATION AND 

CORRESPONDENCE CAN BE MAINTAINED


1957--SPOKE WITH DR. MARLOW AND DISCUSSED ALL OF THE ABOVE. SHE IS VERY FAMILIAR 

WITH THE PT, AND SHE WILL MAKE A FORMAL REFERRAL TO DR. SALDANA.





Impression





   Primary Impression:  


   Lumbar disc disorder


   Additional Impression:  


   Lumbar radiculopathy


Disposition:  01 HOME, SELF-CARE


Condition:  Stable





Departure-Patient Inst.


Referrals:  


MARILU AGUILAR MD (PCP/Family)


Primary Care Physician


Patient Instructions:  Low Back Pain  (DC), Radiculopathy (DC)





Add. Discharge Instructions:  


ALTERNATE ICE AND HEAT TO SORE AREAS AT 20 MINUTE INTERVALS





NO LIFTING OVER  5 LBS, NO TWISTING OR BENDING AT WAIST





CALL [facility] IN THE MORNING, FOR FORMAL REFERRAL TO [facility] NEURO-[facility]INE SURGERY.




DR. SALDANA IS SURGEON ON CALL TODAY.





All discharge instructions reviewed with patient and/or family. Voiced 

understanding.


Scripts


Tizanidine HCl (Tizanidine HCl) 4 Mg Capsule


4 MG PO TID, #15 CAP


   Prov: STEVIE REAVES DO         9/11/19 


Methylprednisolone (Medrol) 4 Mg Tab.ds.pk


4 MG PO UD, #1 PKG


   Prov: STEVIE REAVES DO         9/11/19











JEAN PAULSTEVIE K DO                 Sep 11, 2019 19:05

## 2021-06-10 ENCOUNTER — HOSPITAL ENCOUNTER (OUTPATIENT)
Dept: HOSPITAL 75 - LAB | Age: 51
End: 2021-06-10
Attending: FAMILY MEDICINE
Payer: SELF-PAY

## 2021-06-10 DIAGNOSIS — I25.110: Primary | ICD-10-CM

## 2021-06-10 LAB
ALBUMIN SERPL-MCNC: 4.1 GM/DL (ref 3.2–4.5)
ALP SERPL-CCNC: 139 U/L (ref 40–136)
ALT SERPL-CCNC: 24 U/L (ref 0–55)
BASOPHILS # BLD AUTO: 0.1 10^3/UL (ref 0–0.1)
BASOPHILS NFR BLD AUTO: 1 % (ref 0–10)
BILIRUB SERPL-MCNC: 0.4 MG/DL (ref 0.1–1)
BUN/CREAT SERPL: 6
CALCIUM SERPL-MCNC: 9.8 MG/DL (ref 8.5–10.1)
CHLORIDE SERPL-SCNC: 104 MMOL/L (ref 98–107)
CO2 SERPL-SCNC: 21 MMOL/L (ref 21–32)
CREAT SERPL-MCNC: 1.02 MG/DL (ref 0.6–1.3)
EOSINOPHIL # BLD AUTO: 0.3 10^3/UL (ref 0–0.3)
EOSINOPHIL NFR BLD AUTO: 4 % (ref 0–10)
GFR SERPLBLD BASED ON 1.73 SQ M-ARVRAT: > 60 ML/MIN
GLUCOSE SERPL-MCNC: 111 MG/DL (ref 70–105)
HCT VFR BLD CALC: 51 % (ref 40–54)
HGB BLD-MCNC: 17.3 G/DL (ref 13.3–17.7)
LYMPHOCYTES # BLD AUTO: 2.2 10^3/UL (ref 1–4)
LYMPHOCYTES NFR BLD AUTO: 28 % (ref 12–44)
MANUAL DIFFERENTIAL PERFORMED BLD QL: NO
MCH RBC QN AUTO: 31 PG (ref 25–34)
MCHC RBC AUTO-ENTMCNC: 34 G/DL (ref 32–36)
MCV RBC AUTO: 90 FL (ref 80–99)
MONOCYTES # BLD AUTO: 0.5 10^3/UL (ref 0–1)
MONOCYTES NFR BLD AUTO: 7 % (ref 0–12)
NEUTROPHILS # BLD AUTO: 4.6 10^3/UL (ref 1.8–7.8)
NEUTROPHILS NFR BLD AUTO: 60 % (ref 42–75)
PLATELET # BLD: 306 10^3/UL (ref 130–400)
PMV BLD AUTO: 9.7 FL (ref 9–12.2)
POTASSIUM SERPL-SCNC: 4.2 MMOL/L (ref 3.6–5)
PROT SERPL-MCNC: 8.2 GM/DL (ref 6.4–8.2)
SODIUM SERPL-SCNC: 137 MMOL/L (ref 135–145)
WBC # BLD AUTO: 7.6 10^3/UL (ref 4.3–11)

## 2021-06-10 PROCEDURE — 80053 COMPREHEN METABOLIC PANEL: CPT

## 2021-06-10 PROCEDURE — 84484 ASSAY OF TROPONIN QUANT: CPT

## 2021-06-10 PROCEDURE — 83880 ASSAY OF NATRIURETIC PEPTIDE: CPT

## 2021-06-10 PROCEDURE — 85025 COMPLETE CBC W/AUTO DIFF WBC: CPT

## 2021-06-10 PROCEDURE — 36415 COLL VENOUS BLD VENIPUNCTURE: CPT

## 2023-10-15 ENCOUNTER — HOSPITAL ENCOUNTER (EMERGENCY)
Dept: HOSPITAL 75 - ER | Age: 53
Discharge: ANOTHER HOSPITAL | End: 2023-10-15
Payer: MEDICAID

## 2023-10-15 VITALS — SYSTOLIC BLOOD PRESSURE: 157 MMHG | DIASTOLIC BLOOD PRESSURE: 113 MMHG

## 2023-10-15 VITALS — WEIGHT: 176.37 LBS | BODY MASS INDEX: 26.73 KG/M2 | HEIGHT: 67.99 IN

## 2023-10-15 DIAGNOSIS — F17.210: ICD-10-CM

## 2023-10-15 DIAGNOSIS — R11.2: ICD-10-CM

## 2023-10-15 DIAGNOSIS — I21.3: Primary | ICD-10-CM

## 2023-10-15 DIAGNOSIS — Z95.5: ICD-10-CM

## 2023-10-15 LAB
ALBUMIN SERPL-MCNC: 3.8 GM/DL (ref 3.2–4.5)
ALP SERPL-CCNC: 108 U/L (ref 40–136)
ALT SERPL-CCNC: 24 U/L (ref 0–55)
APTT BLD: 33 SEC (ref 24–35)
BASOPHILS # BLD AUTO: 0 10^3/UL (ref 0–0.1)
BASOPHILS NFR BLD AUTO: 1 % (ref 0–10)
BILIRUB SERPL-MCNC: 0.5 MG/DL (ref 0.1–1)
BUN/CREAT SERPL: 9
CALCIUM SERPL-MCNC: 8.8 MG/DL (ref 8.5–10.1)
CHLORIDE SERPL-SCNC: 103 MMOL/L (ref 98–107)
CO2 SERPL-SCNC: 19 MMOL/L (ref 21–32)
CREAT SERPL-MCNC: 0.96 MG/DL (ref 0.6–1.3)
EOSINOPHIL # BLD AUTO: 0.4 10^3/UL (ref 0–0.3)
EOSINOPHIL NFR BLD AUTO: 4 % (ref 0–10)
GFR SERPLBLD BASED ON 1.73 SQ M-ARVRAT: 96 ML/MIN
GLUCOSE SERPL-MCNC: 133 MG/DL (ref 70–105)
HCT VFR BLD CALC: 43 % (ref 40–54)
HGB BLD-MCNC: 14.7 G/DL (ref 13.3–17.7)
INR PPP: 1 (ref 0.8–1.4)
LYMPHOCYTES # BLD AUTO: 2 10^3/UL (ref 1–4)
LYMPHOCYTES NFR BLD AUTO: 23 % (ref 12–44)
MAGNESIUM SERPL-MCNC: 1.8 MG/DL (ref 1.6–2.4)
MANUAL DIFFERENTIAL PERFORMED BLD QL: NO
MCH RBC QN AUTO: 32 PG (ref 25–34)
MCHC RBC AUTO-ENTMCNC: 35 G/DL (ref 32–36)
MCV RBC AUTO: 91 FL (ref 80–99)
MONOCYTES # BLD AUTO: 0.9 10^3/UL (ref 0–1)
MONOCYTES NFR BLD AUTO: 10 % (ref 0–12)
NEUTROPHILS # BLD AUTO: 5.5 10^3/UL (ref 1.8–7.8)
NEUTROPHILS NFR BLD AUTO: 62 % (ref 42–75)
PLATELET # BLD: 311 10^3/UL (ref 130–400)
PMV BLD AUTO: 10 FL (ref 9–12.2)
POTASSIUM SERPL-SCNC: 3.3 MMOL/L (ref 3.6–5)
PROT SERPL-MCNC: 7 GM/DL (ref 6.4–8.2)
PROTHROMBIN TIME: 13.4 SEC (ref 12.2–14.7)
SODIUM SERPL-SCNC: 135 MMOL/L (ref 135–145)
WBC # BLD AUTO: 8.8 10^3/UL (ref 4.3–11)

## 2023-10-15 PROCEDURE — 82947 ASSAY GLUCOSE BLOOD QUANT: CPT

## 2023-10-15 PROCEDURE — 94002 VENT MGMT INPAT INIT DAY: CPT

## 2023-10-15 PROCEDURE — 31500 INSERT EMERGENCY AIRWAY: CPT

## 2023-10-15 PROCEDURE — 85610 PROTHROMBIN TIME: CPT

## 2023-10-15 PROCEDURE — 83735 ASSAY OF MAGNESIUM: CPT

## 2023-10-15 PROCEDURE — 85730 THROMBOPLASTIN TIME PARTIAL: CPT

## 2023-10-15 PROCEDURE — 84484 ASSAY OF TROPONIN QUANT: CPT

## 2023-10-15 PROCEDURE — 99291 CRITICAL CARE FIRST HOUR: CPT

## 2023-10-15 PROCEDURE — 93041 RHYTHM ECG TRACING: CPT

## 2023-10-15 PROCEDURE — 36415 COLL VENOUS BLD VENIPUNCTURE: CPT

## 2023-10-15 PROCEDURE — 71045 X-RAY EXAM CHEST 1 VIEW: CPT

## 2023-10-15 PROCEDURE — 80053 COMPREHEN METABOLIC PANEL: CPT

## 2023-10-15 PROCEDURE — 51702 INSERT TEMP BLADDER CATH: CPT

## 2023-10-15 PROCEDURE — 85025 COMPLETE CBC W/AUTO DIFF WBC: CPT

## 2023-10-15 NOTE — DIAGNOSTIC IMAGING REPORT
Indication: Chest pain, intubation



Frontal chest obtained at 0810 p.m.



COMPARISON: 07/19/2018



There is cardiomegaly with central vascular congestion and mild

interstitial edema. There is no pneumothorax or pleural fluid. ET

tube tip overlies mid trachea. NG tube tip is below the film.



IMPRESSION:



Cardiomegaly with central vascular congestion and interstitial

edema compatible with CHF. ET tube and NG tube as above.



Dictated by: 



  Dictated on workstation # [id number]

## 2023-10-15 NOTE — ED CHEST PAIN
General


Chief Complaint:  Chest Pain


Stated Complaint:  POSS MI


Source:  patient, EMS





History of Present Illness


Date Seen by Provider:  Oct 15, 2023


Time Seen by Provider:  19:35


Initial Comments


Patient is a 53-year-old male who presents to the emergency room by ambulance 

from [place] chief complaint substernal chest pain, "burning" all day.  

Patient states that he had a brand-new bottle of nitro and basically took the 

entire bottle off-and-on all day long today.  He endorses shortness of breath, 

nausea.  Continues to smoke.  Prior cardiac history, multiple stents.  Last 

stent placed approximately 5 years ago.  Patient states that he is not 

consistent with taking his heart medications.  He does occasionally use 

marijuana.  No alcohol.  No other illicit drugs.  Patient received aspirin, 

morphine, Zofran, IV fluids prior to arrival per EMS.





Helicopter transport arranged prior to patient arrival due to STEMI diversion at

our facility.





Patient awake alert, oriented on arrival.  States pain was improving.  Shortly 

after arrival once the patient was hooked up to monitors and EKG he started 

experiencing a recurrence of pain.  Progressively worsened.  Case was discussed 

with Dr. Menendez at [facility] after EKG identified inferior lateral 

STEMI in progress.  Dr. Menendez defers TNKase at this time.  Accepts patient 

for transfer.  After getting off the phone with Dr. Menendez I was notified by

nursing staff that the patient was looking worse, increasing pain.  He started 

vomiting.  Patient was electively intubated to protect his airway during 

transport.  Family notified, updated on progress and plan.  Wife at bedside 

prior to transfer


Timing/Duration:  12 hours


Severity/Quality:  burning


Location:  central


Radiation:  arms, shoulders (left)


Prior CP/Workup:  cardiac cath, heart attack


ASA po PTA:  Yes


NTG SL PTA:  Yes


Associated Symptoms:  heartburn, nausea/vomiting, shortness of breath, weakness





Allergies and Home Medications


Allergies


Coded Allergies:  


     No Known Drug Allergies (Unverified , 1/4/13)





Patient Home Medication List


Home Medication List Reviewed:  Yes


Amoxicillin/Potassium Clav (Augmentin 875-125 Tablet) 1 Each Tablet, 1 EACH PO 

BID


   Prescribed by: MARILU AGUILAR on 11/13/18 1152


Aspirin (Aspirin EC) 81 Mg Tablet., 81 MG PO DAILY, (Reported)


   Entered as Reported by: ABBI FREDERICK on 4/9/18 1204


Atorvastatin Calcium (Atorvastatin Calcium) 80 Mg Tablet, 80 MG PO DAILY, (Repor

akil)


   Entered as Reported by: ABBI FREDERICK on 4/9/18 1204


Clopidogrel Bisulfate (Plavix) 75 Mg Tablet, 75 MG PO DAILY, (Reported)


   Entered as Reported by: ABBI FREDERICK on 4/9/18 1204


Isosorbide Mononitrate (Isosorbide Mononitrate ER) 60 Mg Tab, 60 MG PO DAILY, 

(Reported)


   Entered as Reported by: QING FONTAINE on 11/12/18 1119


Lisinopril (Lisinopril) 20 Mg Tablet, 20 MG PO DAILY, (Reported)


   Entered as Reported by: ABBI FREDERICK on 4/9/18 1204


Methylprednisolone (Medrol) 4 Mg Tab.ds.pk, 4 MG PO UD


   Prescribed by: STEVIE REAVES on 9/11/19 1912


Metoprolol Tartrate (Metoprolol Tartrate) 50 Mg Tablet, 50 MG PO BID, (Reported)


   Entered as Reported by: ABBI FREDERICK on 4/9/18 1204


Tizanidine HCl (Tizanidine HCl) 4 Mg Capsule, 4 MG PO TID


   Prescribed by: STEVIE REAVES on 9/11/19 1912





Review of Systems


Review of Systems


Constitutional:  see HPI


Respiratory:  Shortness of Air


Cardiovascular:  Chest Pain


Gastrointestinal:  Nausea, Vomiting





Past Medical-Social-Family Hx


Patient Social History


Tobacco Use?:  Yes


Tobacco type used:  Cigarettes


Smoking Status:  Current Everyday Smoker


Use of E-Cig and/or Vaping dev:  No


Substance use?:  No


Alcohol Use?:  No


Pt feels they are or have been:  No





Immunizations Up To Date


Tetanus Booster (TDap):  Unknown


PED Vaccines UTD:  No


Influenza Vaccine Up-to-Date:  No; Not Current


First/Initial COVID19 Vaccinat:  N/A





Seasonal Allergies


Seasonal Allergies:  No





Past Medical History


Surgery/Hospitalization HX:  


EXTENSIVE CARDIAC HISTORY PER PT/STENTS/SMOKER


Surgeries:  Yes


Cardiac, Coronary Stent


Respiratory:  No


Currently Using CPAP:  No


Currently Using BIPAP:  No


Cardiac:  Yes


Coronary Artery Disease, Heart Attack, Heart Murmur, High Cholesterol, 

Hypertension, Irregular Heartbeat


Neurological:  No


Reproductive Disorders:  No


Sexually Transmitted Disease:  No


HIV/AIDS:  No


Genitourinary:  Yes


Kidney Stones


Gastrointestinal:  Yes


Gastrointestinal Bleed


Musculoskeletal:  Yes


Arthritis, Back Injury, Chronic Back Pain


Endocrine:  No


HEENT:  Yes (EXTENSIVE DENTAL DECAY, CHRONIC DENTAL PROBLEMS / ABSCESSES)


Loss of Vision:  Denies


Hearing Impairment:  Denies


Cancer:  No


Psychosocial:  Yes (POLYSUBSTANCE ABUSE; SUICICAL THOUGHTS WHEN UNDER THE 

INFLUENCE)


Anxiety, Depression


Integumentary:  No


Blood Disorders:  No


Adverse Reaction/Blood Tranf:  No





Family Medical History





Unknown





Physical Exam


Vital Signs





Vital Signs - First Documented








 10/15/23 10/15/23





 19:40 20:20


 


Temp 37.0 


 


Pulse 71 


 


Resp 16 


 


B/P (MAP) 144/110 (121) 


 


Pulse Ox 99 


 


O2 Delivery Nasal Cannula 


 


O2 Flow Rate 2.00 


 


FiO2  60





Capillary Refill :


Height, Weight, BMI


Height: 5'7.00"


Weight: 173lbs. 0.0oz. [id number]; 27.00 BMI


Method:Stated


General Appearance:  Chronically ill, Moderate Distress, Thin


HEENT:  PERRL/EOMI


Neck:  Normal Inspection


Respiratory:  Lungs Clear, Normal Breath Sounds, No Accessory Muscle Use


Cardiovascular:  Regular Rate, Rhythm, Normal Peripheral Pulses (2+)


Gastrointestinal:  Soft


Extremity:  Normal Capillary Refill, Normal Range of Motion, No Pedal Edema


Neurologic/Psychiatric:  Alert, Oriented x3


Skin:  Diaphoresis, Pallor





Procedures/Interventions


Date of ETT Placement:  Oct 15, 2023


Time of ETT Placement:  20:00


Intubation Method:  orotracheal


Tube Size:  7.5


Medications:  Etomidate, Rocuronium


Positive End Tide CO2:  Yes


Breath Sounds after Intubation:  bilateral-equal


Intubation Complications:  no complications


Post Intubation Xray:  Yes


inplace





Critical Care Note


Critical Care


Start Time:  19:35


Stop Time:  20:10


Total Time (minutes)


30min critical care time in the eval and management of this patient with STEMI. 

TIme includes initital eval, medication administration/heparin/review of medical

records; discussion with cardiologist to arrange transfer; serial re-evaluation;

time does not include that sepnt in procedure - intubation





Progress/Results/Core Measures


Results/Orders


Lab Results





Laboratory Tests








Test


 10/15/23


19:40 10/15/23


20:00 Range/Units


 


 


White Blood Count


 8.8 


 


 4.3-11.0


10^3/uL


 


Red Blood Count


 4.66 


 


 4.30-5.52


10^6/uL


 


Hemoglobin 14.7   13.3-17.7  g/dL


 


Hematocrit 43   40-54  %


 


Mean Corpuscular Volume 91   80-99  fL


 


Mean Corpuscular Hemoglobin 32   25-34  pg


 


Mean Corpuscular Hemoglobin


Concent 35 


 


 32-36  g/dL





 


Red Cell Distribution Width 12.9   10.0-14.5  %


 


Platelet Count


 311 


 


 130-400


10^3/uL


 


Mean Platelet Volume 10.0   9.0-12.2  fL


 


Immature Granulocyte % (Auto) 0    %


 


Neutrophils (%) (Auto) 62   42-75  %


 


Lymphocytes (%) (Auto) 23   12-44  %


 


Monocytes (%) (Auto) 10   0-12  %


 


Eosinophils (%) (Auto) 4   0-10  %


 


Basophils (%) (Auto) 1   0-10  %


 


Neutrophils # (Auto)


 5.5 


 


 1.8-7.8


10^3/uL


 


Lymphocytes # (Auto)


 2.0 


 


 1.0-4.0


10^3/uL


 


Monocytes # (Auto)


 0.9 


 


 0.0-1.0


10^3/uL


 


Eosinophils # (Auto)


 0.4 H


 


 0.0-0.3


10^3/uL


 


Basophils # (Auto)


 0.0 


 


 0.0-0.1


10^3/uL


 


Immature Granulocyte # (Auto)


 0.0 


 


 0.0-0.1


10^3/uL


 


Prothrombin Time 13.4   12.2-14.7  SEC


 


INR Comment 1.0   0.8-1.4  


 


Activated Partial


Thromboplast Time 33 


 


 24-35  SEC





 


Sodium Level 135   135-145  MMOL/L


 


Potassium Level 3.3 L  3.6-5.0  MMOL/L


 


Chloride Level 103     MMOL/L


 


Carbon Dioxide Level 19 L  21-32  MMOL/L


 


Anion Gap 13   5-14  MMOL/L


 


Blood Urea Nitrogen 9   7-18  MG/DL


 


Creatinine


 0.96 


 


 0.60-1.30


MG/DL


 


Estimat Glomerular Filtration


Rate 96 


 


  





 


BUN/Creatinine Ratio 9    


 


Glucose Level 133 H    MG/DL


 


Calcium Level 8.8   8.5-10.1  MG/DL


 


Corrected Calcium 9.0   8.5-10.1  MG/DL


 


Magnesium Level 1.8   1.6-2.4  MG/DL


 


Total Bilirubin 0.5   0.1-1.0  MG/DL


 


Aspartate Amino Transf


(AST/SGOT) 18 


 


 5-34  U/L





 


Alanine Aminotransferase


(ALT/SGPT) 24 


 


 0-55  U/L





 


Alkaline Phosphatase 108     U/L


 


Troponin I < 0.028   <0.028  NG/ML


 


Total Protein 7.0   6.4-8.2  GM/DL


 


Albumin 3.8   3.2-4.5  GM/DL


 


Glucometer  218 H   MG/DL








My Orders





Orders - PARADISE MARROQUIN MD


Ondansetron Injection (Ondansetron  Inj (10/15/23 19:43)


Morphine  Injection (Morphine  Injection (10/15/23 19:44)


Heparin (Bolus Per Protocol) (Heparin (B (10/15/23 19:48)


Heparin  Drip 77640 Unit/500ml (Heparin (10/15/23 19:48)


Ns Iv 1000 Ml (Ns Iv 1000 Ml) (10/15/23 19:48)


Cbc And Automated Diff (10/15/23 19:58)


Magnesium (10/15/23 19:58)


Chest 1 View, Ap/Pa Only (10/15/23 19:58)


Ekg Tracing (10/15/23 19:58)


Comprehensive Metabolic Panel (10/15/23 19:58)


Protime With Inr (10/15/23 19:58)


Partial Thromboplastin Time (10/15/23 19:58)


O2 (10/15/23 19:58)


Monitor-Rhythm Ecg Trace Only (10/15/23 19:58)


Ed Iv/Invasive Line Start (10/15/23 19:58)


Troponin I [place] (10/15/23 19:58)


Propofol Drip (Icu) (Propofol Drip (Icu) (10/15/23 19:57)





Medications Given in ED





Current Medications








 Medications  Dose


 Ordered  Sig/Isabel


 Route  Start Time


 Stop Time Status Last Admin


Dose Admin


 


 Heparin Sodium


  (Porcine)  10,000 unit  STK-MED ONCE


 .ROUTE  10/15/23 19:48


 10/15/23 19:50 DC 10/15/23 19:49


5,000 UNIT


 


 Heparin Sodium/


 Dextrose  500 ml @ ud  STK-MED ONCE


 IV  10/15/23 19:48


 10/15/23 19:50 DC 10/15/23 19:52


20 MLS/HR


 


 Morphine Sulfate  4 mg  STK-MED ONCE


 .ROUTE  10/15/23 19:44


 10/15/23 19:47 DC 10/15/23 19:47


4 MG


 


 Ondansetron HCl  4 mg  STK-MED ONCE


 .ROUTE  10/15/23 19:43


 10/15/23 19:47 DC 10/15/23 19:45


4 MG


 


 Propofol  100 ml @ ud  STK-MED ONCE


 IV  10/15/23 19:57


 10/15/23 19:59 DC 10/15/23 20:01


14.4 MLS/HR


 


 Sodium Chloride  1,000 ml @ 


 ud  STK-MED ONCE


 .ROUTE  10/15/23 19:48


 10/15/23 19:50 DC 10/15/23 19:49


250 MLS/HR








Vital Signs/I&O











 10/15/23 10/15/23 10/15/23 10/15/23





 19:40 19:40 20:20 20:37


 


Temp  37.0  37.0


 


Pulse  71 103 78


 


Resp  16  14


 


B/P (MAP)  144/110 (121)  157/113


 


Pulse Ox 99 99 100 100


 


O2 Delivery Nasal Cannula Nasal Cannula  Mechanical Ventilator


 


O2 Flow Rate 2.00 2.00  100.00


 


FiO2   60 











Progress


Progress Note :  


   Time:  20:00


Progress Note


Patient seen and evaluated by me.  Evaluation today includes physical exam, 

"chest pain protocol".  CBC, Chem-12, coags, magnesium, troponin, EKG, single 

view chest x-ray.  Pertinent physical exam findings ill-appearing 53-year-old 

male with chest "burning", not tachycardic, heart is regular, diminished lung 

sounds throughout.  Benign abdominal exam.  2+ distal pulses radial.  Poor 

color, pale/gray, diaphoretic.  Answering questions appropriately.  No gross 

neurodeficits.





Differential diagnosis includes STEMI, dissection





Patient treated in the emergency department with IV fluids, Zofran for nausea, 

heparin bolus.  Patient deteriorated shortly after arrival with return of chest 

pain, vomiting.  Case was discussed with Dr. Menendez at [facility] for urgent transfer for STEMI intervention.  Helicopter crew notified 

prior to arrival as we did not have cardiology available for intervention at our

facility.  Patient was electively intubated for airway protection prior to 

transfer in the helicopter.  RSI with rocuronium and etomidate for sedation.  

7.5 ET tube placed using glide scope.  Chest x-ray confirmed placement.  Sedated

with propofol.  Care transferred to flight crew for transfer.


Initial ECG Impression Date:  Oct 15, 2023


Initial ECG Impression Time:  19:41


Initial ECG Rate:  88


Initial ECG Impression:  Acute MI


Comment


acute ST  elevation inferior leads with depression leads V1-V4; Elevation in V5 

and V6





Diagnostic Imaging





   Diagonstic Imaging:  Xray


   Plain Films/CT/US/NM/MRI:  chest


Comments


clear lungs - Intubated - tube 2cm above blas





Departure


Impression





   Primary Impression:  


   STEMI (ST elevation myocardial infarction)


   Qualified Codes:  I21.3 - ST elevation (STEMI) myocardial infarction of 

   unspecified site


Disposition:  02 XFER SHT-TRM HOSP


Condition:  Critical





Transfer


Transfer Reason:  Exceeds level of care


Time Spoke to Accepting Phy:  19:43


Transfer Progress Notes


discussed with Dr Menendez - accepts


Transfer Time:  20:11


Transfer Facility:  


[facility]


Method of Transfer:  Air





Departure-Patient Inst.


Referrals:  


MARILU AGUILAR MD (PCP/Family)


Primary Care Physician





Copy


Copies To 1:   MARILU AGUILAR MD, KATHRYN M MD         Oct 15, 2023 20:09

## 2023-11-26 NOTE — DIAGNOSTIC IMAGING REPORT
PROCEDURE: CT angiography of the chest with contrast.



TECHNIQUE: Axial imaging through the heart was performed after

the administration of intravenous contrast and utilizing the CT

angiography protocol. No reconstructions were performed.

 

INDICATION: Abnormal recent cardiac catheter raising the question

of an aberrant right coronary artery course. The study is

performed for further evaluation.



No prior studies are available for comparison.



FINDINGS: There appears to be moderate dilatation of the main

pulmonary artery. The visualized ascending and descending aorta

appeared to be normal caliber. The arch was not included on this

exam. The left main coronary artery arises from the left coronary

cusp. There appears to be calcified plaque in the left anterior

descending coronary artery. There appears to be plaque and a

probable stent within the circumflex. The right coronary artery

is small. The right coronary artery origin appears to arise from

the left coronary cusp. The right coronary artery does

demonstrate an interarterial course between the ascending aorta

and pulmonary artery trunk, consistent with a malignant course.

There is calcified plaque in the right coronary artery.

Visualized lung fields are clear. No definite pericardial or

pleural fluid is seen.



IMPRESSION:

1. 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

arise from the left coronary sinus and courses interarterial

between the ascending aorta and pulmonary trunk, consistent with

a malignant course of the right coronary artery. The right

coronary artery is small.



Dr. Grijalva was notified of these results prior to this dictation.



Dictated by: 



  Dictated on workstation # [id number]
PAST MEDICAL HISTORY:  No pertinent past medical history